# Patient Record
Sex: FEMALE | Race: WHITE | URBAN - METROPOLITAN AREA
[De-identification: names, ages, dates, MRNs, and addresses within clinical notes are randomized per-mention and may not be internally consistent; named-entity substitution may affect disease eponyms.]

---

## 2017-07-12 ENCOUNTER — OFFICE VISIT (OUTPATIENT)
Dept: FAMILY MEDICINE CLINIC | Age: 77
End: 2017-07-12

## 2017-07-12 VITALS
WEIGHT: 228 LBS | OXYGEN SATURATION: 97 % | HEIGHT: 63 IN | SYSTOLIC BLOOD PRESSURE: 158 MMHG | DIASTOLIC BLOOD PRESSURE: 86 MMHG | BODY MASS INDEX: 40.4 KG/M2 | HEART RATE: 88 BPM | RESPIRATION RATE: 18 BRPM | TEMPERATURE: 97.6 F

## 2017-07-12 DIAGNOSIS — M54.50 CHRONIC BILATERAL LOW BACK PAIN WITHOUT SCIATICA: ICD-10-CM

## 2017-07-12 DIAGNOSIS — I10 ESSENTIAL HYPERTENSION: ICD-10-CM

## 2017-07-12 DIAGNOSIS — F41.8 DEPRESSION WITH ANXIETY: ICD-10-CM

## 2017-07-12 DIAGNOSIS — H40.9 GLAUCOMA, UNSPECIFIED GLAUCOMA, UNSPECIFIED LATERALITY: ICD-10-CM

## 2017-07-12 DIAGNOSIS — L30.9 DERMATITIS: ICD-10-CM

## 2017-07-12 DIAGNOSIS — E11.9 TYPE 2 DIABETES MELLITUS WITHOUT COMPLICATION, WITHOUT LONG-TERM CURRENT USE OF INSULIN (HCC): ICD-10-CM

## 2017-07-12 DIAGNOSIS — G89.29 CHRONIC BILATERAL LOW BACK PAIN WITHOUT SCIATICA: ICD-10-CM

## 2017-07-12 DIAGNOSIS — E53.8 B12 DEFICIENCY: ICD-10-CM

## 2017-07-12 DIAGNOSIS — I48.20 CHRONIC ATRIAL FIBRILLATION (HCC): Primary | ICD-10-CM

## 2017-07-12 LAB
HBA1C MFR BLD HPLC: 7 %
INR BLD: 2.8
PT POC: NORMAL SECONDS
VALID INTERNAL CONTROL?: YES

## 2017-07-12 RX ORDER — ALBUTEROL SULFATE 90 UG/1
AEROSOL, METERED RESPIRATORY (INHALATION)
COMMUNITY
End: 2017-08-02 | Stop reason: ALTCHOICE

## 2017-07-12 RX ORDER — LORAZEPAM 0.5 MG/1
0.5 TABLET ORAL
COMMUNITY
End: 2017-08-02 | Stop reason: ALTCHOICE

## 2017-07-12 RX ORDER — OMEPRAZOLE 40 MG/1
40 CAPSULE, DELAYED RELEASE ORAL DAILY
COMMUNITY
End: 2018-07-02 | Stop reason: SDUPTHER

## 2017-07-12 RX ORDER — CLONIDINE HYDROCHLORIDE 0.1 MG/1
TABLET ORAL 2 TIMES DAILY
COMMUNITY
End: 2017-07-27 | Stop reason: ALTCHOICE

## 2017-07-12 RX ORDER — DULOXETIN HYDROCHLORIDE 60 MG/1
60 CAPSULE, DELAYED RELEASE ORAL DAILY
COMMUNITY
End: 2017-07-12 | Stop reason: ALTCHOICE

## 2017-07-12 RX ORDER — LOSARTAN POTASSIUM 50 MG/1
TABLET ORAL DAILY
COMMUNITY
End: 2018-05-24 | Stop reason: SDUPTHER

## 2017-07-12 RX ORDER — SUCRALFATE 1 G/1
1 TABLET ORAL 4 TIMES DAILY
COMMUNITY
End: 2018-03-06 | Stop reason: SDUPTHER

## 2017-07-12 RX ORDER — TRAZODONE HYDROCHLORIDE 50 MG/1
TABLET ORAL
COMMUNITY
End: 2018-07-12 | Stop reason: SDUPTHER

## 2017-07-12 RX ORDER — METFORMIN HYDROCHLORIDE 1000 MG/1
500 TABLET ORAL DAILY
COMMUNITY
End: 2018-05-07 | Stop reason: ALTCHOICE

## 2017-07-12 RX ORDER — AMLODIPINE BESYLATE 5 MG/1
5 TABLET ORAL DAILY
COMMUNITY
End: 2017-07-27 | Stop reason: ALTCHOICE

## 2017-07-12 RX ORDER — WARFARIN 7.5 MG/1
7.5 TABLET ORAL DAILY
COMMUNITY
End: 2017-07-12 | Stop reason: SDUPTHER

## 2017-07-12 RX ORDER — TRIAMCINOLONE ACETONIDE 1 MG/G
OINTMENT TOPICAL 2 TIMES DAILY
Qty: 30 G | Refills: 0 | Status: SHIPPED | OUTPATIENT
Start: 2017-07-12 | End: 2019-01-21 | Stop reason: SDUPTHER

## 2017-07-12 RX ORDER — RANITIDINE 150 MG/1
150 CAPSULE ORAL 2 TIMES DAILY
COMMUNITY
End: 2018-07-12 | Stop reason: ALTCHOICE

## 2017-07-12 RX ORDER — WARFARIN 7.5 MG/1
7.5 TABLET ORAL DAILY
Qty: 30 TAB | Refills: 0 | Status: SHIPPED | OUTPATIENT
Start: 2017-07-12 | End: 2017-08-06 | Stop reason: SDUPTHER

## 2017-07-12 RX ORDER — LANOLIN ALCOHOL/MO/W.PET/CERES
500 CREAM (GRAM) TOPICAL DAILY
COMMUNITY
End: 2019-01-28 | Stop reason: ALTCHOICE

## 2017-07-12 RX ORDER — OXYCODONE AND ACETAMINOPHEN 5; 325 MG/1; MG/1
TABLET ORAL
Refills: 0 | COMMUNITY
Start: 2017-07-12 | End: 2017-12-04 | Stop reason: SDUPTHER

## 2017-07-12 RX ORDER — VENLAFAXINE HYDROCHLORIDE 37.5 MG/1
37.5 CAPSULE, EXTENDED RELEASE ORAL DAILY
Qty: 30 CAP | Refills: 0 | Status: SHIPPED | OUTPATIENT
Start: 2017-07-12 | End: 2017-08-02 | Stop reason: SDUPTHER

## 2017-07-12 NOTE — MR AVS SNAPSHOT
Visit Information Date & Time Provider Department Dept. Phone Encounter #  
 7/12/2017  1:30 PM Elizabeth Velez, 3 [facility] [phone] [id number] Upcoming Health Maintenance Date Due DTaP/Tdap/Td series (1 - Tdap) 4/21/1959 ZOSTER VACCINE AGE 60> 4/21/1998 GLAUCOMA SCREENING Q2Y 4/21/2003 OSTEOPOROSIS SCREENING (DEXA) 4/21/2003 Pneumococcal 65+ Low/Medium Risk (1 of 2 - PCV13) 4/21/2003 MEDICARE YEARLY EXAM 4/21/2003 INFLUENZA AGE 9 TO ADULT 8/1/2017 Allergies as of 7/12/2017  Review Complete On: 7/12/2017 By: Renetta Delaney LPN Severity Noted Reaction Type Reactions Sulfa (Sulfonamide Antibiotics)  07/12/2017    Photosensitivity Current Immunizations  Never Reviewed No immunizations on file. Not reviewed this visit You Were Diagnosed With   
  
 Codes Comments Chronic atrial fibrillation (HCC)    -  Primary ICD-10-CM: E04.0 ICD-9-CM: 427.31   
 B12 deficiency     ICD-10-CM: E53.8 ICD-9-CM: 266.2 Type 2 diabetes mellitus without complication, without long-term current use of insulin (HCC)     ICD-10-CM: E11.9 ICD-9-CM: 250.00 Essential hypertension     ICD-10-CM: I10 
ICD-9-CM: 401.9 Glaucoma, unspecified glaucoma, unspecified laterality     ICD-10-CM: H40.9 ICD-9-CM: 365.9 Chronic bilateral low back pain without sciatica     ICD-10-CM: M54.5, G89.29 ICD-9-CM: 724.2, 338.29 Depression with anxiety     ICD-10-CM: F41.8 ICD-9-CM: 300.4 Dermatitis     ICD-10-CM: L30.9 ICD-9-CM: 692.9 Vitals BP Pulse Temp Resp Height(growth percentile) Weight(growth percentile) 158/86 88 97.6 °F (36.4 °C) 18 5' 2.5\" (1.588 m) 228 lb (103.4 kg) SpO2 BMI OB Status Smoking Status 97% 41.04 kg/m2 Hysterectomy Never Smoker Vitals History BMI and BSA Data Body Mass Index Body Surface Area 41.04 kg/m 2 2.14 m 2 Preferred Pharmacy Pharmacy Name Phone [facility] [address] [place] [address] Jessie Ya [phone] Your Updated Medication List  
  
   
This list is accurate as of: 7/12/17  2:11 PM.  Always use your most recent med list. amLODIPine 5 mg tablet Commonly known as:  Antonio Stormy Take 5 mg by mouth daily. ATIVAN 0.5 mg tablet Generic drug:  LORazepam  
Take  by mouth. CARAFATE 1 gram tablet Generic drug:  sucralfate Take 1 g by mouth four (4) times daily. cloNIDine HCl 0.1 mg tablet Commonly known as:  CATAPRES Take  by mouth two (2) times a day. cyanocobalamin 500 mcg tablet Commonly known as:  VITAMIN B12 Take 500 mcg by mouth daily. losartan 50 mg tablet Commonly known as:  COZAAR Take  by mouth daily. metFORMIN 1,000 mg tablet Commonly known as:  GLUCOPHAGE Take 1,000 mg by mouth two (2) times daily (with meals). omeprazole 40 mg capsule Commonly known as:  PRILOSEC Take 40 mg by mouth daily. oxyCODONE-acetaminophen 5-325 mg per tablet Commonly known as:  PERCOCET Take  by mouth every four (4) hours as needed for Pain. PROAIR HFA 90 mcg/actuation inhaler Generic drug:  albuterol Take  by inhalation. raNITIdine hcl 150 mg capsule Take 150 mg by mouth two (2) times a day. traZODone 50 mg tablet Commonly known as:  Samule Grills Take  by mouth nightly. triamcinolone acetonide 0.1 % ointment Commonly known as:  KENALOG Apply  to affected area two (2) times a day. use thin layer  
  
 venlafaxine-SR 37.5 mg capsule Commonly known as:  EFFEXOR-XR Take 1 Cap by mouth daily. warfarin 7.5 mg tablet Commonly known as:  COUMADIN Take 1 Tab by mouth daily. Prescriptions Sent to Pharmacy Refills  
 venlafaxine-SR (EFFEXOR-XR) 37.5 mg capsule 0 Sig: Take 1 Cap by mouth daily.   
 Class: Normal  
 Pharmacy: [facility] [address] [facility] Ph #: [phone] Route: Oral  
 warfarin (COUMADIN) 7.5 mg tablet 0 Sig: Take 1 Tab by mouth daily. Class: Normal  
 Pharmacy: [id number] [place] [address] Alida Agarwal Ph #: [phone] Route: Oral  
 triamcinolone acetonide (KENALOG) 0.1 % ointment 0 Sig: Apply  to affected area two (2) times a day. use thin layer Class: Normal  
 Pharmacy: [id number] [place] [address] Alida Agarwal Ph #: [phone] Route: Topical  
  
We Performed the Following AMB POC HEMOGLOBIN A1C [58871 CPT(R)] AMB POC PT/INR [94564 CPT(R)] REFERRAL TO OPHTHALMOLOGY [REF57 Custom] REFERRAL TO PAIN MANAGEMENT [QED098 Custom] Comments:  
 Please evaluate patient for chronic back pain. To-Do List   
 07/12/2017 Lab:  CBC W/O DIFF   
  
 07/12/2017 Lab:  LIPID PANEL   
  
 07/12/2017 Lab:  METABOLIC PANEL, COMPREHENSIVE   
  
 07/12/2017 Lab:  MICROALBUMIN, UR, RAND W/ MICROALBUMIN/CREA RATIO   
  
 07/12/2017 Lab:  VITAMIN B12 Referral Information Referral ID Referred By Referred To  
  
 [id number] [facility], Mouna Tyler MD   
   [address] [facility] , [address] [place] [address] Phone: [phone] Fax: [phone] Visits Status Start Date End Date 1 New Request 7/12/17 7/12/18 If your referral has a status of pending review or denied, additional information will be sent to support the outcome of this decision. Referral ID Referred By Referred To  
 [id number] [facility], MD Rosa Maria  
   [address] [facility], [address] Phone: [phone] Fax: [phone] Visits Status Start Date End Date 1 New Request 7/12/17 7/12/18 If your referral has a status of pending review or denied, additional information will be sent to support the outcome of this decision. Introducing [facility] SERVICES! Hilary Robles introduces [employer] patient portal. Now you can access parts of your medical record, email your doctor's office, and request medication refills online. 1. In your internet browser, go to [email or web address] 2. Click on the First Time User? Click Here link in the Sign In box. You will see the New Member Sign Up page. 3. Enter your [employer] Access Code exactly as it appears below. You will not need to use this code after youve completed the sign-up process. If you do not sign up before the expiration date, you must request a new code. · [employer] Access Code: [email or web address] Expires: 10/10/2017  2:11 PM 
 
4. Enter the last four digits of your Social Security Number (xxxx) and Date of Birth (mm/dd/yyyy) as indicated and click Submit. You will be taken to the next sign-up page. 5. Create a [employer] ID. This will be your [employer] login ID and cannot be changed, so think of one that is secure and easy to remember. 6. Create a [employer] password. You can change your password at any time. 7. Enter your Password Reset Question and Answer. This can be used at a later time if you forget your password. 8. Enter your e-mail address. You will receive e-mail notification when new information is available in [address] 9. Click Sign Up. You can now view and download portions of your medical record. 10. Click the Download Summary menu link to download a portable copy of your medical information. If you have questions, please visit the Frequently Asked Questions section of the [employer] website. Remember, [employer] is NOT to be used for urgent needs. For medical emergencies, dial 911. Now available from your iPhone and Android! Please provide this summary of care documentation to your next provider. Your primary care clinician is listed as Jorge 13. If you have any questions after today's visit, please call [phone].

## 2017-07-12 NOTE — PROGRESS NOTES
New Carter is a 76 y.o. female here today to establish care. 1. Have you been to the ER, urgent care clinic since your last visit? Hospitalized since your last visit? No    2. Have you seen or consulted any other health care providers outside of the [address] [place] [address] since your last visit? Include any pap smears or colon screening.  No

## 2017-07-12 NOTE — PROGRESS NOTES
Assessment/Plan:    Jose Antoine was seen today for [facility] care. Diagnoses and all orders for this visit:    Chronic atrial fibrillation ([address]- cont current regimen. Fu in 2weeks for INR  -     AMB POC PT/INR  -     warfarin (COUMADIN) 7.5 mg tablet; Take 1 Tab by mouth daily. B12 deficiency  -     VITAMIN B12; Future    Type 2 diabetes mellitus without complication, without long-term current use of insulin (HCC)-cont metformin. A1c 7.0.  -     METABOLIC PANEL, COMPREHENSIVE; Future  -     LIPID PANEL; Future  -     CBC W/O DIFF; Future  -     MICROALBUMIN, UR, RAND W/ MICROALBUMIN/CREA RATIO; Future  -     AMB POC HEMOGLOBIN A1C  -     REFERRAL TO OPHTHALMOLOGY    Essential hypertension-slightly high today. F/u 1 mo for bp ck  -     METABOLIC PANEL, COMPREHENSIVE; Future  -     LIPID PANEL; Future  -     CBC W/O DIFF; Future    Glaucoma, unspecified glaucoma, unspecified laterality  -     REFERRAL TO OPHTHALMOLOGY    Chronic bilateral low back pain without sciatica- on percocet  -     REFERRAL TO PAIN MANAGEMENT    Depression with anxiety-change cymbalta to effexor.  -     venlafaxine-SR (EFFEXOR-XR) 37.5 mg capsule; Take 1 Cap by mouth daily. Dermatitis  -refilled  -     triamcinolone acetonide (KENALOG) 0.1 % ointment; Apply  to affected area two (2) times a day. use thin layer        The plan was discussed with the patient. The patient verbalized understanding and is in agreement with the plan. All medication potential side effects were discussed with the patient.   Health Maintenance:   Health Maintenance   Topic Date Due    DTaP/Tdap/Td series (1 - Tdap) 04/21/1959    ZOSTER VACCINE AGE 60>  04/21/1998    GLAUCOMA SCREENING Q2Y  04/21/2003    OSTEOPOROSIS SCREENING (DEXA)  04/21/2003    Pneumococcal 65+ Low/Medium Risk (1 of 2 - PCV13) 04/21/2003    MEDICARE YEARLY EXAM  04/21/2003    INFLUENZA AGE 9 TO ADULT  08/01/2017       Winston Lind is a 76 y.o.  female and presents with \[facility]\"" Care Subjective:  Pt is here to establish care. HTN- slightly high today. afib - INR today is 2.8. On coumadin 7.5mg.     DM2 - on metformin. Last eye exam was 3 mos ago in [place]. a1c 7.0. Asthma - uses albuterol on sparingly. Has h/o breast cancer on R- s/p mastectomy, chemo. No XRT. Depression  -on cymbalta. Sx are not controlled. States she will wake up feeling like everything is gloomy. Uses trazodone for sleep. Doesn't feel her anxiety is controlled. Has been on zoloft but it was ineffective. Back pain - on cymbalta. She is unsure why she has pain in her back. Reportedly had MRI that showed arthritis. Had previously been on narcotics- she weaned herself off that. She takes oxycodone 5/325 bid. Chronic gastritis - on PPI and H2 blocker. Also on carafate. Denies PUD. States they weren't able to find a reason why she had such abd pain. ROS:  Constitutional: No recent weight change. No weakness/fatigue. No f/c. Skin: No rashes, change in nails/hair, itching   HENT: No HA, dizziness. No hearing loss/tinnitus. No nasal congestion/discharge. Eyes: No change in vision, double/blurred vision or eye pain/redness. Cardiovascular: No CP/palpitations. No CHO/orthopnea/PND. Respiratory: No cough/sputum, dyspnea, wheezing. Gastointestinal: No dysphagia,+ reflux. No n/v. No constipation/diarrhea. No melena/rectal bleeding. Genitourinary: No dysuria, urinary hesitancy, nocturia, hematuria. No incontinence. Musculoskeletal: + joint pain/stiffness. No muscle pain/tenderness. Endo: No heat/cold intolerance, no polyuria/polydypsia. Heme: No h/o anemia. No easy bleeding/bruising. Allergy/Immunology: No seasonal rhinitis. Denies frequent colds, sinus/ear infections. Neurological: No seizures/numbness/weakness. No paresthesias. Psychiatric:  + depression, +anxiety.    PMH:  Past Medical History:   Diagnosis Date    A-fib ([facility] [address]     Asthma     Chronic back pain     Depression     Diabetes ([facility]     Gastritis     History of right breast cancer     Hypertension        PSH:  Past Surgical History:   Procedure Laterality Date    HX APPENDECTOMY      HX MASTECTOMY      right        SH:  Social History   Substance Use Topics    Smoking status: Never Smoker    Smokeless tobacco: Never Used    Alcohol use No       FH:  Family History   Problem Relation Age of Onset    Asthma Mother     Hypertension Father     Heart Attack Father        Medications/Allergies:    Current Outpatient Prescriptions:     metFORMIN (GLUCOPHAGE) 1,000 mg tablet, Take 1,000 mg by mouth two (2) times daily (with meals). , Disp: , Rfl:     warfarin (COUMADIN) 7.5 mg tablet, Take 7.5 mg by mouth daily. , Disp: , Rfl:     losartan (COZAAR) 50 mg tablet, Take  by mouth daily. , Disp: , Rfl:     cloNIDine HCl (CATAPRES) 0.1 mg tablet, Take  by mouth two (2) times a day., Disp: , Rfl:     albuterol (PROAIR HFA) 90 mcg/actuation inhaler, Take  by inhalation. , Disp: , Rfl:     omeprazole (PRILOSEC) 40 mg capsule, Take 40 mg by mouth daily. , Disp: , Rfl:     raNITIdine hcl 150 mg capsule, Take 150 mg by mouth two (2) times a day., Disp: , Rfl:     sucralfate (CARAFATE) 1 gram tablet, Take 1 g by mouth four (4) times daily. , Disp: , Rfl:     DULoxetine (CYMBALTA) 60 mg capsule, Take 60 mg by mouth daily. , Disp: , Rfl:     traZODone (DESYREL) 50 mg tablet, Take  by mouth nightly., Disp: , Rfl:     LORazepam (ATIVAN) 0.5 mg tablet, Take  by mouth., Disp: , Rfl:     cyanocobalamin (VITAMIN B12) 500 mcg tablet, Take 500 mcg by mouth daily. , Disp: , Rfl:     amLODIPine (NORVASC) 5 mg tablet, Take 5 mg by mouth daily. , Disp: , Rfl:   Allergies   Allergen Reactions    Sulfa (Sulfonamide Antibiotics) Photosensitivity       Objective:  Visit Vitals    /86    Pulse 88    Temp 97.6 °F (36.4 °C)    Resp 18    Ht 5' 2.5\" (1.588 m)    Wt 228 lb (103.4 kg)    SpO2 97%    BMI 41.04 kg/m2 Constitutional: Well developed, nourished, no distress, alert, obese habitus   HENT: Exterior ears and tympanic membranes normal bilaterally. Supple neck. No thyromegaly or lymphadenopathy. Oropharynx clear and moist mucous membranes. Eyes: Conjunctiva normal. PERRL. Cardiovascular: S1, S2.  RRR. 2/6 CLIVE SABINE w/o radiation. No thrills palpated. No carotid bruits. Intact distal pulses. No edema. Pulmonary/Chest Wall: No abnormalities on inspection. Clear to auscultation bilaterally. No wheezing/rhonchi. Normal effort. GI: Soft, nontender, nondistended. Normal active bowel sounds. No  masses on palpation. No hepatosplenomegaly. Musculoskeletal: Gait normal.  Joints without deformity/tenderness. Neurological: Appropriate. No focal motor or sensory deficits. Speech normal.   Skin: No lesions/rashes on inspection. Psych: Appropriate affect, judgement and insight. Short-term memory intact.

## 2017-07-13 PROBLEM — M85.89 OSTEOPENIA OF MULTIPLE SITES: Status: ACTIVE | Noted: 2017-07-13

## 2017-07-13 PROBLEM — G47.33 OSA (OBSTRUCTIVE SLEEP APNEA): Status: ACTIVE | Noted: 2017-07-13

## 2017-07-21 ENCOUNTER — HOSPITAL ENCOUNTER (OUTPATIENT)
Dept: LAB | Age: 77
Discharge: HOME | End: 2017-07-21

## 2017-07-21 PROCEDURE — 99001 SPECIMEN HANDLING PT-LAB: CPT | Performed by: INTERNAL MEDICINE

## 2017-07-22 LAB
ALBUMIN SERPL-MCNC: 4.1 G/DL (ref 3.5–4.8)
ALBUMIN/CREAT UR: 8.1 MG/G CREAT (ref 0–30)
ALBUMIN/GLOB SERPL: 1.4 {RATIO} (ref 1.2–2.2)
ALP SERPL-CCNC: 114 IU/L (ref 39–117)
ALT SERPL-CCNC: 17 IU/L (ref 0–32)
AST SERPL-CCNC: 32 IU/L (ref 0–40)
BILIRUB SERPL-MCNC: 0.3 MG/DL (ref 0–1.2)
BUN SERPL-MCNC: 11 MG/DL (ref 8–27)
BUN/CREAT SERPL: 14 (ref 12–28)
CALCIUM SERPL-MCNC: 10.1 MG/DL (ref 8.7–10.3)
CHLORIDE SERPL-SCNC: 101 MMOL/L (ref 96–106)
CHOLEST SERPL-MCNC: 187 MG/DL (ref 100–199)
CO2 SERPL-SCNC: 24 MMOL/L (ref 18–29)
CREAT SERPL-MCNC: 0.76 MG/DL (ref 0.57–1)
CREAT UR-MCNC: 220.3 MG/DL
ERYTHROCYTE [DISTWIDTH] IN BLOOD BY AUTOMATED COUNT: 16.4 % (ref 12.3–15.4)
GLOBULIN SER CALC-MCNC: 3 G/DL (ref 1.5–4.5)
GLUCOSE SERPL-MCNC: 140 MG/DL (ref 65–99)
HCT VFR BLD AUTO: 36.6 % (ref 34–46.6)
HDLC SERPL-MCNC: 46 MG/DL
HGB BLD-MCNC: 11.3 G/DL (ref 11.1–15.9)
INTERPRETATION, 910389: NORMAL
LDLC SERPL CALC-MCNC: 103 MG/DL (ref 0–99)
MCH RBC QN AUTO: 25.7 PG (ref 26.6–33)
MCHC RBC AUTO-ENTMCNC: 30.9 G/DL (ref 31.5–35.7)
MCV RBC AUTO: 83 FL (ref 79–97)
MICROALBUMIN UR-MCNC: 17.9 UG/ML
PLATELET # BLD AUTO: 284 X10E3/UL (ref 150–379)
POTASSIUM SERPL-SCNC: 4.4 MMOL/L (ref 3.5–5.2)
PROT SERPL-MCNC: 7.1 G/DL (ref 6–8.5)
RBC # BLD AUTO: 4.39 X10E6/UL (ref 3.77–5.28)
SODIUM SERPL-SCNC: 142 MMOL/L (ref 134–144)
TRIGL SERPL-MCNC: 189 MG/DL (ref 0–149)
VIT B12 SERPL-MCNC: 1021 PG/ML (ref 211–946)
VLDLC SERPL CALC-MCNC: 38 MG/DL (ref 5–40)
WBC # BLD AUTO: 6.3 X10E3/UL (ref 3.4–10.8)

## 2017-07-24 ENCOUNTER — TELEPHONE (OUTPATIENT)
Dept: FAMILY MEDICINE CLINIC | Age: 77
End: 2017-07-24

## 2017-07-24 NOTE — TELEPHONE ENCOUNTER
Pt called with BP readings 133/98 rechecked 133/122, \"not feeling well\", pulse 132. Advised pt to go to [facility] , gave directions to spouse, pt new to area.

## 2017-07-27 ENCOUNTER — PATIENT OUTREACH (OUTPATIENT)
Dept: FAMILY MEDICINE CLINIC | Age: 77
End: 2017-07-27

## 2017-07-27 PROBLEM — E11.9 TYPE 2 DIABETES MELLITUS WITHOUT COMPLICATION, WITHOUT LONG-TERM CURRENT USE OF INSULIN (HCC): Status: ACTIVE | Noted: 2017-07-24

## 2017-07-27 PROBLEM — I48.91 ATRIAL FIBRILLATION WITH RAPID VENTRICULAR RESPONSE (HCC): Status: ACTIVE | Noted: 2017-07-24

## 2017-07-27 PROBLEM — I11.9 HYPERTENSIVE HEART DISEASE WITHOUT HEART FAILURE: Status: ACTIVE | Noted: 2017-07-24

## 2017-07-27 PROBLEM — I48.91 A-FIB (HCC): Status: ACTIVE | Noted: 2017-07-24

## 2017-07-27 PROBLEM — I45.10 RIGHT BUNDLE BRANCH BLOCK (RBBB): Status: ACTIVE | Noted: 2017-07-24

## 2017-07-27 RX ORDER — METOPROLOL TARTRATE 50 MG/1
50 TABLET ORAL 2 TIMES DAILY
COMMUNITY
End: 2017-08-02 | Stop reason: ALTCHOICE

## 2017-07-27 RX ORDER — LATANOPROST 50 UG/ML
1 SOLUTION/ DROPS OPHTHALMIC
COMMUNITY
End: 2019-01-28 | Stop reason: ALTCHOICE

## 2017-07-27 RX ORDER — ACETAMINOPHEN 500 MG
500 TABLET ORAL
COMMUNITY
Start: 2017-07-26 | End: 2019-01-28 | Stop reason: ALTCHOICE

## 2017-07-27 RX ORDER — DILTIAZEM HYDROCHLORIDE 60 MG/1
60 CAPSULE, EXTENDED RELEASE ORAL EVERY 12 HOURS
COMMUNITY
Start: 2017-07-26 | End: 2017-08-07 | Stop reason: SDUPTHER

## 2017-07-27 NOTE — PROGRESS NOTES
[facility] Admission from 7/24 - 26/2017 for A-Fib with Rapid Ventricular Rate. RRAT score: 19 Medium    Medical History:     Past Medical History:   Diagnosis Date    A-fib ([address]     Asthma     Chronic back pain     Depression     Diabetes ([address]     Gastritis     History of right breast cancer     Hypertension           This represents Transitions of Care b/c NN spoke with patient and/or caregiver within 1 business days of discharge. Pt's TCM follow up appt is scheduled with Dr. Viral Mckinnon on Wednesday 8/2/2017 @ 2 pm which is within 5 business days of discharge.  Called patient on 7/27/2017,first number no one answered voice mail not set up. Second number spouse answered and gave patient the phone. Verified with 2 identifiers.      Patient presenting symptoms Rapid Heart Rate          Course of current Hospitalization (referenced by Levy Ferguson MD - 07/26/2017   note): [facility] Problems   Diagnosis    Atrial fibrillation with rapid ventricular response ([address]   7/24/17 ER \"76 yo female presents with palpitations for 2 weeks. She moved here from [address] [place] [address] and recently established care with a new pcp, not a cardiologist. For the alst 2 weeks she has felt palpitations\" Hx AF on warfarin  /96,  AF with RBBB, K 3.7, TSH 1.72, Troponin < 0.01 x2  7/25/17 ECHO EF 75%, LVH 12/11, Mild MR with MAC     Right bundle branch block (RBBB)    A-fib ([address]    Hypertensive heart disease without heart failure   On clonidine, metoprolol, losartan     Type 2 diabetes mellitus without complication, without long-term current use of insulin ([address]   On metformin    This is a 76year old female with A fib and RVR. She ruled out for MI and her echocardiogram showed normal systolic function but diastolic dysfunction. She was seen by cardiology and her medications were adjusted. She converted back to NSR. She is dismissed in good condition.  Diagnosed with A-Fib With Rapid Ventricular Rate. Admitted to Hospitalist Service with consults from [place]. Seen by Dr Maddie Ovalles, Cardiologist . Card given to patient instructing her to have PCP get referral for a follow up to be seen in his office.      Significant Lab/Diagnostic Findings: PT/INR 20.1 /1.96 Discharge on 7.5 mg coumadin              Medication Reconciliation completed: YES                                  Roland See   

## 2017-08-02 ENCOUNTER — OFFICE VISIT (OUTPATIENT)
Dept: FAMILY MEDICINE CLINIC | Age: 77
End: 2017-08-02

## 2017-08-02 VITALS
OXYGEN SATURATION: 96 % | BODY MASS INDEX: 41.41 KG/M2 | RESPIRATION RATE: 22 BRPM | HEART RATE: 86 BPM | WEIGHT: 225 LBS | SYSTOLIC BLOOD PRESSURE: 138 MMHG | HEIGHT: 62 IN | TEMPERATURE: 97.7 F | DIASTOLIC BLOOD PRESSURE: 82 MMHG

## 2017-08-02 DIAGNOSIS — R06.2 WHEEZING: ICD-10-CM

## 2017-08-02 DIAGNOSIS — F41.8 DEPRESSION WITH ANXIETY: Primary | ICD-10-CM

## 2017-08-02 DIAGNOSIS — Z79.01 ANTICOAGULATION MONITORING, INR RANGE 2-3: ICD-10-CM

## 2017-08-02 DIAGNOSIS — I48.0 PAROXYSMAL ATRIAL FIBRILLATION (HCC): ICD-10-CM

## 2017-08-02 DIAGNOSIS — L98.9 SKIN LESION: ICD-10-CM

## 2017-08-02 PROBLEM — I48.91 ATRIAL FIBRILLATION WITH RAPID VENTRICULAR RESPONSE (HCC): Status: RESOLVED | Noted: 2017-07-24 | Resolved: 2017-08-02

## 2017-08-02 LAB
INR BLD: 2.9
PT POC: 34.5 SECONDS
VALID INTERNAL CONTROL?: YES

## 2017-08-02 RX ORDER — METOPROLOL TARTRATE 50 MG/1
TABLET ORAL 2 TIMES DAILY
COMMUNITY
End: 2018-07-26 | Stop reason: SDUPTHER

## 2017-08-02 RX ORDER — VENLAFAXINE HYDROCHLORIDE 75 MG/1
75 CAPSULE, EXTENDED RELEASE ORAL DAILY
Qty: 30 CAP | Refills: 1 | Status: SHIPPED | OUTPATIENT
Start: 2017-08-02 | End: 2017-09-06 | Stop reason: SDUPTHER

## 2017-08-02 NOTE — PROGRESS NOTES
Assessment/Plan:    1. Depression with anxiety  -incr dose to 75mg. F/u in 1mo. - venlafaxine-SR (EFFEXOR-XR) 75 mg capsule; Take 1 Cap by mouth daily. Dispense: 30 Cap; Refill: 1    2. Paroxysmal atrial fibrillation (HCC)  -in sinus currently. Cont BB and dilt. F/u with Dr. Jenny Hendrickson from [place] [address]    3. Wheezing  -ck PFTs  - PULMONARY FUNCTION TEST; Future    4. Skin lesion- seems to respond to steroids, but spreading.  - REFERRAL TO DERMATOLOGY    5. Anticoagulation monitoring, INR range 2-3  -INR at goal. Cont 7.5mg. Repeat INR in 1 mo    6. Fatigue  -multifactorial - related to uncontrolled depression, untreated dairan, medication side effects    The plan was discussed with the patient. The patient verbalized understanding and is in agreement with the plan. All medication potential side effects were discussed with the patient. Health Maintenance: referred to [facility], pt to make appt. Health Maintenance   Topic Date Due    EYE EXAM RETINAL OR DILATED Q1  04/21/1948    DTaP/Tdap/Td series (1 - Tdap) 04/21/1959    ZOSTER VACCINE AGE 60>  02/21/1998    Pneumococcal 65+ Low/Medium Risk (1 of 2 - PCV13) 04/21/2003    MEDICARE YEARLY EXAM  04/21/2003    INFLUENZA AGE 9 TO ADULT  08/01/2017    HEMOGLOBIN A1C Q6M  01/12/2018    MICROALBUMIN Q1  07/21/2018    LIPID PANEL Q1  07/21/2018    FOOT EXAM Q1  08/02/2018    GLAUCOMA SCREENING Q2Y  05/18/2019    OSTEOPOROSIS SCREENING (DEXA)  Completed       Pura Grullon is a 76 y.o. female and presents with Hospital Follow Up and Irregular Heart Beat     Subjective:  Ms. Pura Grullon is a 76y.o. year old female, she is seen today for Transition of Care services following a hospital discharge for afib with RVR on 7/26. Our office Nurse Navigator performed an outreach to Ms. Maru Bradley on 7/27 (within 2 business days of discharge) to complete medication reconciliation and a telephonic assessment of her condition.   She was started on diltiazem and converted to NSR. She states she still feels fatigued. She is having sweats. No further palpitations. HR up to 90s at home. Echo showed stage II diastolic dysfunction. INR today is 2.9 on 7.5mg coumadin. Depression and anxiety - was given ativan in the hospital.  Was changed to effexor at last visit. Pt's daughter notes wheezing. Pt has h/o being prescribed symbicort but states she's never had PFTs. Fatigue - daughter is concerned with fatigue. ROS:  Constitutional: No recent weight change. No weakness/+fatigue. No f/c. Cardiovascular: No CP/+palpitations. No CHO/orthopnea/PND. Respiratory: No cough/sputum, dyspnea, +wheezing. Gastointestinal: No dysphagia, reflux. No n/v. No constipation/diarrhea. No melena/rectal bleeding. Genitourinary: No dysuria, urinary hesitancy, nocturia, hematuria. No incontinence. Musculoskeletal: No joint pain/stiffness. No muscle pain/tenderness. Endo: No heat/cold intolerance, no polyuria/polydypsia. Heme: No h/o anemia. No easy bleeding/bruising. Allergy/Immunology: No seasonal rhinitis. Denies frequent colds, sinus/ear infections. Neurological: No seizures/numbness/weakness. No paresthesias. Psychiatric:  + depression, +anxiety. The problem list was updated as a part of today's visit. Patient Active Problem List   Diagnosis Code    Osteopenia of multiple sites M85.89    ANA CRISTINA (obstructive sleep apnea) G47.33    A-fib ([facility]) I48.91    Atrial fibrillation with rapid ventricular response ([facility]) I48.91    Hypertensive heart disease without heart failure I11.9    Right bundle branch block (RBBB) I45.10    Type 2 diabetes mellitus without complication, without long-term current use of insulin ([facility]) E11.9       The PSH, FH were reviewed.     SH:  Social History   Substance Use Topics    Smoking status: Never Smoker    Smokeless tobacco: Never Used    Alcohol use No       Medications/Allergies:  Current Outpatient Prescriptions on File Prior to Visit   Medication Sig Dispense Refill    acetaminophen (TYLENOL) 500 mg tablet Take 500 mg by mouth every four (4) hours as needed.  brinzolamide-brimonidine 1-0.2 % drps Administer 1 Drop to both eyes two (2) times a day.  dilTIAZem SR (CARDIZEM SR) 60 mg SR capsule Take 60 mg by mouth every twelve (12) hours.  latanoprost (XALATAN) 0.005 % ophthalmic solution Administer 1 Drop to both eyes nightly.  metFORMIN (GLUCOPHAGE) 1,000 mg tablet Take 1,000 mg by mouth daily.  losartan (COZAAR) 50 mg tablet Take  by mouth daily.  omeprazole (PRILOSEC) 40 mg capsule Take 40 mg by mouth daily.  raNITIdine hcl 150 mg capsule Take 150 mg by mouth two (2) times a day.  sucralfate (CARAFATE) 1 gram tablet Take 1 g by mouth four (4) times daily.  traZODone (DESYREL) 50 mg tablet Take  by mouth nightly.  cyanocobalamin (VITAMIN B12) 500 mcg tablet Take 500 mcg by mouth daily.  oxyCODONE-acetaminophen (PERCOCET) 5-325 mg per tablet Take  by mouth every four (4) hours as needed for Pain.  0    venlafaxine-SR (EFFEXOR-XR) 37.5 mg capsule Take 1 Cap by mouth daily. 30 Cap 0    warfarin (COUMADIN) 7.5 mg tablet Take 1 Tab by mouth daily. 30 Tab 0    triamcinolone acetonide (KENALOG) 0.1 % ointment Apply  to affected area two (2) times a day. use thin layer 30 g 0    metoprolol tartrate (LOPRESSOR) 50 mg tablet Take 50 mg by mouth two (2) times a day.  albuterol (PROAIR HFA) 90 mcg/actuation inhaler Take  by inhalation.  LORazepam (ATIVAN) 0.5 mg tablet Take 0.5 mg by mouth every twelve (12) hours as needed. No current facility-administered medications on file prior to visit.          Allergies   Allergen Reactions    Sulfa (Sulfonamide Antibiotics) Photosensitivity       Objective:  Visit Vitals    /82    Pulse 86    Temp 97.7 °F (36.5 °C) (Oral)    Resp 22    Ht 5' 2\" (1.575 m)    Wt 225 lb (102.1 kg)    SpO2 96%  BMI 41.15 kg/m2      Constitutional: Well developed, nourished, no distress, alert, obese habitus   CV: S1, S2.  RRR. No murmurs/rubs. No thrills palpated. No carotid bruits. Intact distal pulses. No edema. Pulm: No abnormalities on inspection. Clear to auscultation bilaterally. No wheezing/rhonchi. Normal effort. Skin: +erythematous papules on legs bilat    Psych: Appropriate affect, judgement and insight. Short-term memory intact. Diabetic foot exam:     Left:    Filament test normal sensation with micro filament       Labwork and Ancillary Studies:    CBC w/Diff  Lab Results   Component Value Date/Time    WBC 6.3 07/21/2017 09:56 AM    HGB 11.3 07/21/2017 09:56 AM    PLATELET 248 96/43/6635 09:56 AM         Basic Metabolic Profile/LFTs  Lab Results   Component Value Date/Time    Sodium 142 07/21/2017 09:56 AM    Potassium 4.4 07/21/2017 09:56 AM    Chloride 101 07/21/2017 09:56 AM    CO2 24 07/21/2017 09:56 AM    Glucose 140 07/21/2017 09:56 AM    BUN 11 07/21/2017 09:56 AM    Creatinine 0.76 07/21/2017 09:56 AM    BUN/Creatinine ratio 14 07/21/2017 09:56 AM    GFR est AA 86 07/21/2017 09:56 AM    GFR est non-AA 75 07/21/2017 09:56 AM    Calcium 10.1 07/21/2017 09:56 AM      Lab Results   Component Value Date/Time    ALT (SGPT) 17 07/21/2017 09:56 AM    AST (SGOT) 32 07/21/2017 09:56 AM    Alk.  phosphatase 114 07/21/2017 09:56 AM    Bilirubin, total 0.3 07/21/2017 09:56 AM       Cholesterol  Lab Results   Component Value Date/Time    Cholesterol, total 187 07/21/2017 09:56 AM    HDL Cholesterol 46 07/21/2017 09:56 AM    LDL, calculated 103 07/21/2017 09:56 AM    Triglyceride 189 07/21/2017 09:56 AM

## 2017-08-02 NOTE — MR AVS SNAPSHOT
Visit Information Date & Time Provider Department Dept. Phone Encounter #  
 8/2/2017  2:00 PM Luis Bonilla [phone] [id number] Upcoming Health Maintenance Date Due  
 EYE EXAM RETINAL OR DILATED Q1 4/21/1950 DTaP/Tdap/Td series (1 - Tdap) 4/21/1959 ZOSTER VACCINE AGE 60> 2/21/1998 Pneumococcal 65+ Low/Medium Risk (1 of 2 - PCV13) 4/21/2003 MEDICARE YEARLY EXAM 4/21/2003 INFLUENZA AGE 9 TO ADULT 8/1/2017 HEMOGLOBIN A1C Q6M 1/12/2018 MICROALBUMIN Q1 7/21/2018 LIPID PANEL Q1 7/21/2018 FOOT EXAM Q1 8/2/2018 GLAUCOMA SCREENING Q2Y 5/18/2019 Allergies as of 8/2/2017  Review Complete On: 8/2/2017 By: Burak Montgomery LPN Severity Noted Reaction Type Reactions Sulfa (Sulfonamide Antibiotics)  07/12/2017    Photosensitivity Current Immunizations  Never Reviewed No immunizations on file. Not reviewed this visit You Were Diagnosed With   
  
 Codes Comments Paroxysmal atrial fibrillation (HCC)    -  Primary ICD-10-CM: I48.0 ICD-9-CM: 427.31 Depression with anxiety     ICD-10-CM: F41.8 ICD-9-CM: 300.4 Wheezing     ICD-10-CM: R06.2 ICD-9-CM: 786.07 Skin lesion     ICD-10-CM: L98.9 ICD-9-CM: 709.9 Vitals BP Pulse Temp Resp Height(growth percentile) Weight(growth percentile) 138/82 86 97.7 °F (36.5 °C) (Oral) 22 5' 2\" (1.575 m) 225 lb (102.1 kg) SpO2 BMI OB Status Smoking Status 96% 41.15 kg/m2 Hysterectomy Never Smoker BMI and BSA Data Body Mass Index Body Surface Area  
 41.15 kg/m 2 2.11 m 2 Preferred Pharmacy Pharmacy Name Phone [facility] [address] [phone] Your Updated Medication List  
  
   
This list is accurate as of: 8/2/17  2:49 PM.  Always use your most recent med list.  
  
  
  
  
 acetaminophen 500 mg tablet Commonly known as:  TYLENOL  
 Take 500 mg by mouth every four (4) hours as needed. brinzolamide-brimonidine 1-0.2 % Drps Administer 1 Drop to both eyes two (2) times a day. CARAFATE 1 gram tablet Generic drug:  sucralfate Take 1 g by mouth four (4) times daily. cyanocobalamin 500 mcg tablet Commonly known as:  VITAMIN B12 Take 500 mcg by mouth daily. dilTIAZem SR 60 mg SR capsule Commonly known as:  CARDIZEM SR Take 60 mg by mouth every twelve (12) hours. latanoprost 0.005 % ophthalmic solution Commonly known as:  Sherry Adjutant Administer 1 Drop to both eyes nightly. LOPRESSOR 50 mg tablet Generic drug:  metoprolol tartrate Take  by mouth two (2) times a day. losartan 50 mg tablet Commonly known as:  COZAAR Take  by mouth daily. metFORMIN 1,000 mg tablet Commonly known as:  GLUCOPHAGE Take 1,000 mg by mouth daily. omeprazole 40 mg capsule Commonly known as:  PRILOSEC Take 40 mg by mouth daily. oxyCODONE-acetaminophen 5-325 mg per tablet Commonly known as:  PERCOCET Take  by mouth every four (4) hours as needed for Pain. raNITIdine hcl 150 mg capsule Take 150 mg by mouth two (2) times a day. traZODone 50 mg tablet Commonly known as:   [place] Take  by mouth nightly. triamcinolone acetonide 0.1 % ointment Commonly known as:  KENALOG Apply  to affected area two (2) times a day. use thin layer  
  
 venlafaxine-SR 75 mg capsule Commonly known as:  EFFEXOR-XR Take 1 Cap by mouth daily. warfarin 7.5 mg tablet Commonly known as:  COUMADIN Take 1 Tab by mouth daily. Prescriptions Sent to Pharmacy Refills  
 venlafaxine-SR (EFFEXOR-XR) 75 mg capsule 1 Sig: Take 1 Cap by mouth daily. Class: Normal  
 Pharmacy: [address] [place] [address],  [place]  Merle Howell Ph #: [phone] Route: Oral  
  
We Performed the Following REFERRAL TO CARDIOLOGY [UYU97 Custom] REFERRAL TO DERMATOLOGY [REF19 Custom] To-Do List   
 Around 08/02/2017 PFT:  PULMONARY FUNCTION TEST Referral Information Referral ID Referred By Referred To  
  
 [id number] [facility], Jimy Young MD   
   [address] [facility] [address] Phone: [phone] Fax: [phone] Visits Status Start Date End Date 1 New Request 8/2/17 8/2/18 If your referral has a status of pending review or denied, additional information will be sent to support the outcome of this decision. Referral ID Referred By Referred To  
 [id number] [facility], Emery Adams, [address] Phone: [phone] Fax: [phone] Visits Status Start Date End Date 1 New Request 8/2/17 8/2/18 If your referral has a status of pending review or denied, additional information will be sent to support the outcome of this decision. Patient Instructions [facility] Dr Yajaira Krishnan [address] [place] [address], [address] [place] [address] 
[phone] Introducing [facility]! Katy Leslie introduces [employer] patient portal. Now you can access parts of your medical record, email your doctor's office, and request medication refills online. 1. In your internet browser, go to [email or web address] 2. Click on the First Time User? Click Here link in the Sign In box. You will see the New Member Sign Up page. 3. Enter your [employer] Access Code exactly as it appears below. You will not need to use this code after youve completed the sign-up process. If you do not sign up before the expiration date, you must request a new code. · [employer] Access Code: [email or web address] Expires: 10/10/2017  2:11 PM 
 
4.  Enter the last four digits of your Social Security Number (xxxx) and Date of Birth (mm/dd/yyyy) as indicated and click Submit. You will be taken to the next sign-up page. 5. Create a [employer] ID. This will be your [employer] login ID and cannot be changed, so think of one that is secure and easy to remember. 6. Create a [employer] password. You can change your password at any time. 7. Enter your Password Reset Question and Answer. This can be used at a later time if you forget your password. 8. Enter your e-mail address. You will receive e-mail notification when new information is available in [address] 9. Click Sign Up. You can now view and download portions of your medical record. 10. Click the Download Summary menu link to download a portable copy of your medical information. If you have questions, please visit the Frequently Asked Questions section of the [employer] website. Remember, [employer] is NOT to be used for urgent needs. For medical emergencies, dial 911. Now available from your iPhone and Android! Please provide this summary of care documentation to your next provider. Your primary care clinician is listed as Jorge 13. If you have any questions after today's visit, please call [phone].

## 2017-08-02 NOTE — PATIENT INSTRUCTIONS
[facility]  Dr Shannan Gilbert  [address] [place] [address]  [phone]

## 2017-08-02 NOTE — PROGRESS NOTES
New Carter is a 76 y.o. female  Patient here for hospital follow up. 1. Have you been to the ER, urgent care clinic since your last visit? Hospitalized since your last visit? Dwayne oliver    2. Have you seen or consulted any other health care providers outside of the [address] [place] [address] since your last visit? Include any pap smears or colon screening.  No

## 2017-08-04 ENCOUNTER — TELEPHONE (OUTPATIENT)
Dept: FAMILY MEDICINE CLINIC | Age: 77
End: 2017-08-04

## 2017-08-04 NOTE — TELEPHONE ENCOUNTER
Pt called in reporting that the current doctor we had set up with for dermatology does not accept her insurance([employer] HMO/medicare replacement). Please advise.

## 2017-08-05 DIAGNOSIS — I48.20 CHRONIC ATRIAL FIBRILLATION (HCC): ICD-10-CM

## 2017-08-06 RX ORDER — WARFARIN 7.5 MG/1
TABLET ORAL
Qty: 30 TAB | Refills: 0 | Status: SHIPPED | COMMUNITY
Start: 2017-08-06 | End: 2017-08-31 | Stop reason: SDUPTHER

## 2017-08-07 ENCOUNTER — PATIENT OUTREACH (OUTPATIENT)
Dept: FAMILY MEDICINE CLINIC | Age: 77
End: 2017-08-07

## 2017-08-07 RX ORDER — DILTIAZEM HYDROCHLORIDE 60 MG/1
60 CAPSULE, EXTENDED RELEASE ORAL EVERY 12 HOURS
Qty: 60 CAP | Refills: 0 | Status: SHIPPED | OUTPATIENT
Start: 2017-08-07 | End: 2017-08-31 | Stop reason: SDUPTHER

## 2017-08-07 NOTE — PROGRESS NOTES
Patient Outreach made to patient. She states she is doing okay. She is requesting refill for Dilitazem and Ativan. I explained to patient that  discontinued Ativan on 8/2 and started Effexor. She should contact office next week and speak with  if she still wants refill for the Ativan. Refill request sent to  for Diltiazem. Patient will call our office if any other assistance is needed.

## 2017-08-14 ENCOUNTER — PATIENT OUTREACH (OUTPATIENT)
Dept: FAMILY MEDICINE CLINIC | Age: 77
End: 2017-08-14

## 2017-08-16 PROBLEM — R06.2 WHEEZING: Status: ACTIVE | Noted: 2017-08-16

## 2017-08-23 ENCOUNTER — TELEPHONE (OUTPATIENT)
Dept: FAMILY MEDICINE CLINIC | Age: 77
End: 2017-08-23

## 2017-08-23 NOTE — TELEPHONE ENCOUNTER
[facility] called because they received a referral for the pt but it says \" No approval necessary\" and the patient has a [facility] plan that typically needs approval.     They would like us to fax [employer] approval over to them if we have it.      Fax # [phone]

## 2017-08-24 ENCOUNTER — PATIENT OUTREACH (OUTPATIENT)
Dept: FAMILY MEDICINE CLINIC | Age: 77
End: 2017-08-24

## 2017-08-24 NOTE — PROGRESS NOTES
Patient Outreach made to patient. She states she is doing okay. She declines any assistance from our office at this time. I advised her to call office if assistance is needed.

## 2017-08-31 DIAGNOSIS — I48.20 CHRONIC ATRIAL FIBRILLATION (HCC): ICD-10-CM

## 2017-08-31 RX ORDER — WARFARIN 7.5 MG/1
TABLET ORAL
Qty: 30 TAB | Refills: 0 | Status: SHIPPED | OUTPATIENT
Start: 2017-08-31 | End: 2017-10-05 | Stop reason: SDUPTHER

## 2017-09-06 ENCOUNTER — OFFICE VISIT (OUTPATIENT)
Dept: FAMILY MEDICINE CLINIC | Age: 77
End: 2017-09-06

## 2017-09-06 VITALS
DIASTOLIC BLOOD PRESSURE: 82 MMHG | WEIGHT: 228 LBS | HEIGHT: 62 IN | TEMPERATURE: 98.2 F | SYSTOLIC BLOOD PRESSURE: 160 MMHG | RESPIRATION RATE: 18 BRPM | BODY MASS INDEX: 41.96 KG/M2 | HEART RATE: 77 BPM | OXYGEN SATURATION: 96 %

## 2017-09-06 DIAGNOSIS — I48.0 PAROXYSMAL ATRIAL FIBRILLATION (HCC): ICD-10-CM

## 2017-09-06 DIAGNOSIS — E66.01 SEVERE OBESITY (BMI >= 40) (HCC): ICD-10-CM

## 2017-09-06 DIAGNOSIS — F41.8 DEPRESSION WITH ANXIETY: Primary | ICD-10-CM

## 2017-09-06 DIAGNOSIS — Z79.01 ANTICOAGULATION MONITORING, INR RANGE 2-3: ICD-10-CM

## 2017-09-06 PROBLEM — I10 ESSENTIAL HYPERTENSION: Status: ACTIVE | Noted: 2017-09-06

## 2017-09-06 PROBLEM — I11.9 HYPERTENSIVE HEART DISEASE WITHOUT HEART FAILURE: Status: RESOLVED | Noted: 2017-07-24 | Resolved: 2017-09-06

## 2017-09-06 LAB
INR BLD: 2.7
PT POC: 32.3 SECONDS
VALID INTERNAL CONTROL?: YES

## 2017-09-06 RX ORDER — VENLAFAXINE HYDROCHLORIDE 75 MG/1
75 CAPSULE, EXTENDED RELEASE ORAL DAILY
Qty: 90 CAP | Refills: 1 | Status: SHIPPED | OUTPATIENT
Start: 2017-09-06 | End: 2018-03-05 | Stop reason: SDUPTHER

## 2017-09-06 NOTE — PROGRESS NOTES
Assessment/Plan:    1. Depression with anxiety  -cont current regimen. F/u in 1/2018  - venlafaxine-SR [facility] 75 mg capsule; Take 1 Cap by mouth daily. Dispense: 90 Cap; Refill: 1    2. Paroxysmal atrial fibrillation ([address]  Anticoagulation monitoring, INR range 2-3-cont same dose. F/u in 1mo. - AMB POC PT/INR    3. Severe obesity (BMI >= 40) (HCC)  I have reviewed/discussed the above normal BMI with the patient. I have recommended the following interventions: encourage exercise and monitor weight . [place] The plan was discussed with the patient. The patient verbalized understanding and is in agreement with the plan. All medication potential side effects were discussed with the patient. Health Maintenance:   Health Maintenance   Topic Date Due    Pneumococcal 65+ Low/Medium Risk (1 of 2 - PCV13) 04/21/2003    MEDICARE YEARLY EXAM  04/21/2003    INFLUENZA AGE 9 TO ADULT  08/01/2017    HEMOGLOBIN A1C Q6M  01/12/2018    EYE EXAM RETINAL OR DILATED Q1  05/18/2018    MICROALBUMIN Q1  07/21/2018    LIPID PANEL Q1  07/21/2018    FOOT EXAM Q1  08/02/2018    GLAUCOMA SCREENING Q2Y  05/18/2019    DTaP/Tdap/Td series (2 - Td) 09/06/2027    OSTEOPOROSIS SCREENING (DEXA)  Completed    ZOSTER VACCINE AGE 60>  Addressed       Arnaldo Bianchi is a 76 y.o. female and presents with Hypertension     Subjective:  Depression anxiety - dose effexor increased at last visit. She states she feels better and feels the increased dose is helping. Afib - on coumadin. INR is 2.7. HTN - bp elevated. Was seen by celia, who is managing bp. ROS:  Constitutional: No recent weight change. No weakness/fatigue. No f/c. Cardiovascular: No CP/palpitations. No CHO/orthopnea/PND. Respiratory: No cough/sputum, dyspnea, wheezing. Gastointestinal: No dysphagia, reflux. No n/v. No constipation/diarrhea. No melena/rectal bleeding. Neurological: No seizures/numbness/weakness. No paresthesias.    Psychiatric:  + depression,+ anxiety. The problem list was updated as a part of today's visit. Patient Active Problem List   Diagnosis Code    Osteopenia of multiple sites M85.89    ANA CRISTINA (obstructive sleep apnea) G47.33    A-fib ([facility]) I48.91    Hypertensive heart disease without heart failure I11.9    Right bundle branch block (RBBB) I45.10    Type 2 diabetes mellitus without complication, without long-term current use of insulin ([facility]) E11.9    Anticoagulation monitoring, INR range 2-3 Z79.01    Wheezing R06.2       The PSH, FH were reviewed. SH:  Social History   Substance Use Topics    Smoking status: Never Smoker    Smokeless tobacco: Never Used    Alcohol use No       Medications/Allergies:  Current Outpatient Prescriptions on File Prior to Visit   Medication Sig Dispense Refill    warfarin (COUMADIN) 7.5 mg tablet TAKE ONE TABLET BY MOUTH ONCE DAILY 30 Tab 0    dilTIAZem SR (CARDIZEM SR) 60 mg SR capsule TAKE ONE CAPSULE BY MOUTH EVERY 12 HOURS 180 Cap 1    metoprolol tartrate (LOPRESSOR) 50 mg tablet Take  by mouth two (2) times a day.  venlafaxine-SR (EFFEXOR-XR) 75 mg capsule Take 1 Cap by mouth daily. 30 Cap 1    acetaminophen (TYLENOL) 500 mg tablet Take 500 mg by mouth every four (4) hours as needed.  brinzolamide-brimonidine 1-0.2 % drps Administer 1 Drop to both eyes two (2) times a day.  latanoprost (XALATAN) 0.005 % ophthalmic solution Administer 1 Drop to both eyes nightly.  metFORMIN (GLUCOPHAGE) 1,000 mg tablet Take 1,000 mg by mouth daily.  losartan (COZAAR) 50 mg tablet Take  by mouth daily.  omeprazole (PRILOSEC) 40 mg capsule Take 40 mg by mouth daily.  raNITIdine hcl 150 mg capsule Take 150 mg by mouth two (2) times a day.  sucralfate (CARAFATE) 1 gram tablet Take 1 g by mouth four (4) times daily.  traZODone (DESYREL) 50 mg tablet Take  by mouth nightly.  cyanocobalamin (VITAMIN B12) 500 mcg tablet Take 500 mcg by mouth daily.       oxyCODONE-acetaminophen (PERCOCET) 5-325 mg per tablet Take  by mouth every four (4) hours as needed for Pain.  0    triamcinolone acetonide (KENALOG) 0.1 % ointment Apply  to affected area two (2) times a day. use thin layer 30 g 0     No current facility-administered medications on file prior to visit. Allergies   Allergen Reactions    Sulfa (Sulfonamide Antibiotics) Photosensitivity       Objective:  Visit Vitals    /82 (BP 1 Location: Left arm, BP Patient Position: Sitting)    Pulse 77    Temp 98.2 °F (36.8 °C) (Oral)    Resp 18    Ht 5' 2\" (1.575 m)    Wt 228 lb (103.4 kg)    LMP  (Exact Date)    SpO2 96%    BMI 41.7 kg/m2      Constitutional: Well developed, nourished, no distress, alert, obese habitus   CV: S1, S2.  RRR. No murmurs/rubs. No thrills palpated. No carotid bruits. Intact distal pulses. No edema. Pulm: No abnormalities on inspection. Clear to auscultation bilaterally. No wheezing/rhonchi. Normal effort. GI: Soft, nontender, nondistended. Normal active bowel sounds. Neuro: A/O x 3. No focal motor or sensory deficits. Speech normal.   Psych: Appropriate affect, judgement and insight. Short-term memory intact.

## 2017-09-06 NOTE — PROGRESS NOTES
1. Have you been to the ER, urgent care clinic since your last visit? Hospitalized since your last visit? No   2. Have you seen or consulted any other health care providers outside of the [address] [place] [address] since your last visit? Include any pap smears or colon screening. Yes, dermatology, dilated eye exam Dr. Gregorio Reveles , cardiology August 2017   Pt states last INR at cardiology clinic was 2.6    Health Maintenance Due   Topic Date Due    DTaP/Tdap/Td series (1 - Tdap) 04/21/1959    ZOSTER VACCINE AGE 60>  02/21/1998    Pneumococcal 65+ Low/Medium Risk (1 of 2 - PCV13) 04/21/2003    MEDICARE YEARLY EXAM  04/21/2003    INFLUENZA AGE 9 TO ADULT  08/01/2017   Pt will consider flu vaccine.

## 2017-09-06 NOTE — MR AVS SNAPSHOT
Visit Information Date & Time Provider Department Dept. Phone Encounter #  
 9/6/2017  1:00 PM Elizabeth Velez, [facility] [phone] [id number] Upcoming Health Maintenance Date Due Pneumococcal 65+ Low/Medium Risk (1 of 2 - PCV13) 4/21/2003 MEDICARE YEARLY EXAM 4/21/2003 INFLUENZA AGE 9 TO ADULT 8/1/2017 HEMOGLOBIN A1C Q6M 1/12/2018 EYE EXAM RETINAL OR DILATED Q1 5/18/2018 MICROALBUMIN Q1 7/21/2018 LIPID PANEL Q1 7/21/2018 FOOT EXAM Q1 8/2/2018 GLAUCOMA SCREENING Q2Y 5/18/2019 DTaP/Tdap/Td series (2 - Td) 9/6/2027 Allergies as of 9/6/2017  Review Complete On: 9/6/2017 By: Marlen Desai LPN Severity Noted Reaction Type Reactions Sulfa (Sulfonamide Antibiotics)  07/12/2017    Photosensitivity Current Immunizations  Never Reviewed No immunizations on file. Not reviewed this visit You Were Diagnosed With   
  
 Codes Comments Depression with anxiety    -  Primary ICD-10-CM: F41.8 ICD-9-CM: 300.4 Paroxysmal atrial fibrillation (HCC)     ICD-10-CM: I48.0 ICD-9-CM: 427.31 Anticoagulation monitoring, INR range 2-3     ICD-10-CM: Z79.01 
ICD-9-CM: V58.61 Severe obesity (BMI >= 40) (HCC)     ICD-10-CM: E66.01 
ICD-9-CM: 278.01 Vitals BP Pulse Temp Resp Height(growth percentile) Weight(growth percentile) 160/82 (BP 1 Location: Left arm, BP Patient Position: Sitting) 77 98.2 °F (36.8 °C) (Oral) 18 5' 2\" (1.575 m) 228 lb (103.4 kg) LMP SpO2 BMI OB Status Smoking Status (Exact Date) 96% 41.7 kg/m2 Hysterectomy Never Smoker Vitals History BMI and BSA Data Body Mass Index Body Surface Area 41.7 kg/m 2 2.13 m 2 Preferred Pharmacy Pharmacy Name Phone [facility] [address] [place], [place] [address]rigo Schultz [phone] Your Updated Medication List  
  
   
This list is accurate as of: 9/6/17  1:38 PM.  Always use your most recent med list.  
  
  
  
  
 acetaminophen 500 mg tablet Commonly known as:  TYLENOL Take 500 mg by mouth every four (4) hours as needed. brinzolamide-brimonidine 1-0.2 % Drps Administer 1 Drop to both eyes two (2) times a day. CARAFATE 1 gram tablet Generic drug:  sucralfate Take 1 g by mouth four (4) times daily. cyanocobalamin 500 mcg tablet Commonly known as:  VITAMIN B12 Take 500 mcg by mouth daily. dilTIAZem SR 60 mg SR capsule Commonly known as:  CARDIZEM SR  
TAKE ONE CAPSULE BY MOUTH EVERY 12 HOURS  
  
 latanoprost 0.005 % ophthalmic solution Commonly known as:  Claudine Finders Administer 1 Drop to both eyes nightly. LOPRESSOR 50 mg tablet Generic drug:  metoprolol tartrate Take  by mouth two (2) times a day. losartan 50 mg tablet Commonly known as:  COZAAR Take  by mouth daily. metFORMIN 1,000 mg tablet Commonly known as:  GLUCOPHAGE Take 1,000 mg by mouth daily. omeprazole 40 mg capsule Commonly known as:  PRILOSEC Take 40 mg by mouth daily. oxyCODONE-acetaminophen 5-325 mg per tablet Commonly known as:  PERCOCET Take  by mouth every four (4) hours as needed for Pain. raNITIdine hcl 150 mg capsule Take 150 mg by mouth two (2) times a day. traZODone 50 mg tablet Commonly known as:  Rayma Medal Take  by mouth nightly. triamcinolone acetonide 0.1 % ointment Commonly known as:  KENALOG Apply  to affected area two (2) times a day. use thin layer  
  
 venlafaxine-SR 75 mg capsule Commonly known as:  EFFEXOR-XR Take 1 Cap by mouth daily. warfarin 7.5 mg tablet Commonly known as:  COUMADIN  
TAKE ONE TABLET BY MOUTH ONCE DAILY Prescriptions Sent to Pharmacy Refills  
 venlafaxine-SR (EFFEXOR-XR) 75 mg capsule 1 Sig: Take 1 Cap by mouth daily. Class: Normal  
 Pharmacy: [address], [place] [address] Marleny Whyte Ph #: [phone] Route: Oral  
  
We Performed the Following AMB POC PT/INR [35688 CPT(R)] Introducing hospitals & HEALTH SERVICES! Ema Zepeda introduces [email or web address] patient portal. Now you can access parts of your medical record, email your doctor's office, and request medication refills online. 1. In your internet browser, go to [email or web address] 2. Click on the First Time User? Click Here link in the Sign In box. You will see the New Member Sign Up page. 3. Enter your [employer] Access Code exactly as it appears below. You will not need to use this code after youve completed the sign-up process. If you do not sign up before the expiration date, you must request a new code. · [email or web address] Access Code: [email or web address] Expires: 10/10/2017  2:11 PM 
 
4. Enter the last four digits of your Social Security Number (xxxx) and Date of Birth (mm/dd/yyyy) as indicated and click Submit. You will be taken to the next sign-up page. 5. Create a [email or web address] ID. This will be your [email or web address] login ID and cannot be changed, so think of one that is secure and easy to remember. 6. Create a [email or web address] password. You can change your password at any time. 7. Enter your Password Reset Question and Answer. This can be used at a later time if you forget your password. 8. Enter your e-mail address. You will receive e-mail notification when new information is available in [address] 9. Click Sign Up. You can now view and download portions of your medical record. 10. Click the Download Summary menu link to download a portable copy of your medical information. If you have questions, please visit the Frequently Asked Questions section of the [email or web address] website. Remember, [email or web address] is NOT to be used for urgent needs. For medical emergencies, dial 911. Now available from your iPhone and Android! Please provide this summary of care documentation to your next provider. Your primary care clinician is listed as Jorge Sharp. If you have any questions after today's visit, please call [phone].

## 2017-10-05 DIAGNOSIS — I48.20 CHRONIC ATRIAL FIBRILLATION (HCC): ICD-10-CM

## 2017-10-05 RX ORDER — WARFARIN 7.5 MG/1
TABLET ORAL
Qty: 30 TAB | Refills: 0 | Status: SHIPPED | OUTPATIENT
Start: 2017-10-05 | End: 2017-11-02 | Stop reason: SDUPTHER

## 2017-10-06 ENCOUNTER — CLINICAL SUPPORT (OUTPATIENT)
Dept: FAMILY MEDICINE CLINIC | Age: 77
End: 2017-10-06

## 2017-10-06 VITALS
BODY MASS INDEX: 41.96 KG/M2 | SYSTOLIC BLOOD PRESSURE: 138 MMHG | HEIGHT: 62 IN | RESPIRATION RATE: 16 BRPM | WEIGHT: 228 LBS | OXYGEN SATURATION: 95 % | HEART RATE: 77 BPM | TEMPERATURE: 98.2 F | DIASTOLIC BLOOD PRESSURE: 70 MMHG

## 2017-10-06 DIAGNOSIS — I48.0 PAROXYSMAL ATRIAL FIBRILLATION (HCC): Primary | ICD-10-CM

## 2017-10-06 DIAGNOSIS — Z23 ENCOUNTER FOR IMMUNIZATION: ICD-10-CM

## 2017-10-06 LAB
INR BLD: 2
PT POC: 23.7 SECONDS
VALID INTERNAL CONTROL?: YES

## 2017-10-06 NOTE — PROGRESS NOTES
Anita Lynch is a 76 y.o. female who presents today for Anticoagulation monitoring. Indication: Atrial Fibrillation  INR Goal: 2.0-3.0. Current dose:  Coumadin 7.5 mg daily. Missed Coumadin Doses:  None  Medication Changes:  no  Dietary Changes:  no    Symptoms: taking coumadin appropriately without any bleeding. Latest INRs:  Lab Results   Component Value Date/Time    INR POC 2.0 10/06/2017 01:14 PM    INR POC 2.7 09/06/2017 01:25 PM    INR POC 2.9 08/02/2017 03:07 PM        New Coumadin dose:.current treatment plan is effective, no change in therapy. Next check to be scheduled for  4 weeks.

## 2017-11-02 DIAGNOSIS — I48.20 CHRONIC ATRIAL FIBRILLATION (HCC): ICD-10-CM

## 2017-11-02 RX ORDER — WARFARIN 7.5 MG/1
TABLET ORAL
Qty: 30 TAB | Refills: 0 | Status: SHIPPED | OUTPATIENT
Start: 2017-11-02 | End: 2017-11-30 | Stop reason: SDUPTHER

## 2017-11-06 ENCOUNTER — CLINICAL SUPPORT (OUTPATIENT)
Dept: FAMILY MEDICINE CLINIC | Age: 77
End: 2017-11-06

## 2017-11-06 VITALS
OXYGEN SATURATION: 94 % | HEIGHT: 62 IN | BODY MASS INDEX: 41.96 KG/M2 | SYSTOLIC BLOOD PRESSURE: 140 MMHG | HEART RATE: 78 BPM | RESPIRATION RATE: 22 BRPM | DIASTOLIC BLOOD PRESSURE: 82 MMHG | WEIGHT: 228 LBS | TEMPERATURE: 98.4 F

## 2017-11-06 DIAGNOSIS — I48.91 ATRIAL FIBRILLATION, UNSPECIFIED TYPE (HCC): Primary | ICD-10-CM

## 2017-11-06 NOTE — PROGRESS NOTES
Too Aleman is a 76 y.o. female who presents today for Anticoagulation monitoring. Indication: Atrial Fibrillation  INR Goal: 2.0-3.0. Current dose:  Coumadin  7.5 mg daily. Missed Coumadin Doses:  None  Medication Changes:  no  Dietary Changes:  no    Symptoms: taking coumadin appropriately without any bleeding. Latest INRs:  Lab Results   Component Value Date/Time    INR POC 2.0 10/06/2017 01:14 PM    INR POC 2.7 09/06/2017 01:25 PM    INR POC 2.9 08/02/2017 03:07 PM        New Coumadin dose:.current treatment plan is effective, no change in therapy. Next check to be scheduled for  4 weeks.

## 2017-11-30 DIAGNOSIS — I48.20 CHRONIC ATRIAL FIBRILLATION (HCC): ICD-10-CM

## 2017-11-30 RX ORDER — WARFARIN 7.5 MG/1
TABLET ORAL
Qty: 30 TAB | Refills: 0 | Status: SHIPPED | OUTPATIENT
Start: 2017-11-30 | End: 2017-12-28 | Stop reason: SDUPTHER

## 2017-12-04 ENCOUNTER — OFFICE VISIT (OUTPATIENT)
Dept: FAMILY MEDICINE CLINIC | Age: 77
End: 2017-12-04

## 2017-12-04 VITALS
HEIGHT: 62 IN | TEMPERATURE: 98.4 F | HEART RATE: 81 BPM | BODY MASS INDEX: 41.37 KG/M2 | DIASTOLIC BLOOD PRESSURE: 89 MMHG | OXYGEN SATURATION: 96 % | WEIGHT: 224.8 LBS | SYSTOLIC BLOOD PRESSURE: 130 MMHG | RESPIRATION RATE: 20 BRPM

## 2017-12-04 DIAGNOSIS — M54.50 CHRONIC BILATERAL LOW BACK PAIN WITHOUT SCIATICA: Primary | ICD-10-CM

## 2017-12-04 DIAGNOSIS — I48.91 ATRIAL FIBRILLATION, UNSPECIFIED TYPE (HCC): ICD-10-CM

## 2017-12-04 DIAGNOSIS — Z00.00 INITIAL MEDICARE ANNUAL WELLNESS VISIT: ICD-10-CM

## 2017-12-04 DIAGNOSIS — R29.6 RECURRENT FALLS: ICD-10-CM

## 2017-12-04 DIAGNOSIS — G89.29 CHRONIC BILATERAL LOW BACK PAIN WITHOUT SCIATICA: Primary | ICD-10-CM

## 2017-12-04 DIAGNOSIS — E11.9 TYPE 2 DIABETES MELLITUS WITHOUT COMPLICATION, WITHOUT LONG-TERM CURRENT USE OF INSULIN (HCC): ICD-10-CM

## 2017-12-04 LAB
HBA1C MFR BLD HPLC: 6.5 %
INR BLD: 2.4
PT POC: 29.3 SECONDS
VALID INTERNAL CONTROL?: YES

## 2017-12-04 RX ORDER — MELATONIN
1000 DAILY
COMMUNITY
Start: 2017-12-04 | End: 2019-01-28 | Stop reason: ALTCHOICE

## 2017-12-04 RX ORDER — OXYCODONE AND ACETAMINOPHEN 5; 325 MG/1; MG/1
1 TABLET ORAL
Qty: 30 TAB | Refills: 0 | Status: SHIPPED | OUTPATIENT
Start: 2017-12-04 | End: 2018-03-05 | Stop reason: ALTCHOICE

## 2017-12-04 NOTE — PROGRESS NOTES
Laron Betts is a 76 y.o. female (: 1940) presenting to address:    Chief Complaint   Patient presents with    Annual Wellness Visit    Anticoagulation       Vitals:    17 1331   BP: 130/89   Pulse: 81   Resp: 20   Temp: 98.4 °F (36.9 °C)   TempSrc: Oral   SpO2: 96%   Weight: 224 lb 12.8 oz (102 kg)   Height: 5' 2\" (1.575 m)   PainSc:   8   PainLoc: Generalized       Hearing/Vision:      Visual Acuity Screening    Right eye Left eye Both eyes   Without correction:      With correction: 20/50 20/200 20/20       Learning Assessment:   No flowsheet data found. Depression Screening:     PHQ over the last two weeks 2017   PHQ Not Done Active Diagnosis of Depression or Bipolar Disorder   Little interest or pleasure in doing things -   Feeling down, depressed or hopeless -   Total Score PHQ 2 -   Trouble falling or staying asleep, or sleeping too much -   Feeling tired or having little energy -   Poor appetite or overeating -   Feeling bad about yourself - or that you are a failure or have let yourself or your family down -   Trouble concentrating on things such as school, work, reading or watching TV -   Moving or speaking so slowly that other people could have noticed; or the opposite being so fidgety that others notice -   Thoughts of being better off dead, or hurting yourself in some way -   PHQ 9 Score -   How difficult have these problems made it for you to do your work, take care of your home and get along with others -     Fall Risk Assessment:     Fall Risk Assessment, last 12 mths 2017   Able to walk? Yes   Fall in past 12 months? Yes   Fall with injury? Yes   Number of falls in past 12 months 1   Fall Risk Score 2     Abuse Screening:     Abuse Screening Questionnaire 2017   Do you ever feel afraid of your partner? N   Are you in a relationship with someone who physically or mentally threatens you? N   Is it safe for you to go home? Y     Coordination of Care Questionaire:   1.  Have you been to the ER, urgent care clinic since your last visit? Hospitalized since your last visit? NO    2. Have you seen or consulted any other health care providers outside of the [facility] since your last visit? Include any pap smears or colon screening. NO    Advanced Directive:   1. Do you have an Advanced Directive? YES    2. Would you like information on Advanced Directives?  NO

## 2017-12-04 NOTE — MR AVS SNAPSHOT
Visit Information Date & Time Provider Department Dept. Phone Encounter #  
 12/4/2017  1:30 PM Valarie Rawls [facility] [phone] Your Appointments 1/4/2018  1:00 PM  
Follow Up with Valarie Rawls MD  
[facility] [address] [facility]) Appt Note: 4 month f/u; r/s for 30 mins; r/s back to 15 mins- Gcode not needed [address] [facility] [address] [phone]  
  
   
 [address] [facility] [address] [facility] Upcoming Health Maintenance Date Due HEMOGLOBIN A1C Q6M 6/4/2018 MICROALBUMIN Q1 7/21/2018 LIPID PANEL Q1 7/21/2018 FOOT EXAM Q1 8/2/2018 EYE EXAM RETINAL OR DILATED Q1 8/3/2018 Pneumococcal 65+ Low/Medium Risk (2 of 2 - PPSV23) 12/4/2018 MEDICARE YEARLY EXAM 12/5/2018 GLAUCOMA SCREENING Q2Y 8/3/2019 DTaP/Tdap/Td series (2 - Td) 9/6/2027 Allergies as of 12/4/2017  Review Complete On: 12/4/2017 By: Valarie Rawls MD  
  
 Severity Noted Reaction Type Reactions Sulfa (Sulfonamide Antibiotics)  07/12/2017    Photosensitivity Current Immunizations  Never Reviewed Name Date Influenza High Dose Vaccine PF 10/6/2017  1:17 PM  
  
 Not reviewed this visit You Were Diagnosed With   
  
 Codes Comments Atrial fibrillation, unspecified type ([facility]    -  Primary ICD-10-CM: I48.91 
ICD-9-CM: 427.31 Type 2 diabetes mellitus without complication, without long-term current use of insulin (HCC)     ICD-10-CM: E11.9 ICD-9-CM: 250.00 Initial Medicare annual wellness visit     ICD-10-CM: Z00.00 ICD-9-CM: V70.0 Recurrent falls     ICD-10-CM: R29.6 ICD-9-CM: V15.88 Chronic bilateral low back pain without sciatica     ICD-10-CM: M54.5, G89.29 ICD-9-CM: 724.2, 338.29 Vitals BP Pulse Temp Resp Height(growth percentile) Weight(growth percentile)  130/89 (BP 1 Location: Left arm, BP Patient Position: Sitting) 81 98.4 °F (36.9 °C) (Oral) 20 5' 2\" (1.575 m) 224 lb 12.8 oz (102 kg) LMP SpO2 BMI OB Status Smoking Status (Exact Date) 96% 41.12 kg/m2 Hysterectomy Never Smoker Vitals History BMI and BSA Data Body Mass Index Body Surface Area  
 41.12 kg/m 2 2.11 m 2 Preferred Pharmacy Pharmacy Name Phone [facility] [address] [place], [place] [address]e [place] [phone] Your Updated Medication List  
  
   
This list is accurate as of: 12/4/17  1:57 PM.  Always use your most recent med list.  
  
  
  
  
 acetaminophen 500 mg tablet Commonly known as:  TYLENOL Take 500 mg by mouth every four (4) hours as needed. brinzolamide-brimonidine 1-0.2 % Drps Administer 1 Drop to both eyes two (2) times a day. CARAFATE 1 gram tablet Generic drug:  sucralfate Take 1 g by mouth four (4) times daily. cholecalciferol 1,000 unit tablet Commonly known as:  VITAMIN D3 Take 1 Tab by mouth daily. cyanocobalamin 500 mcg tablet Commonly known as:  VITAMIN B12 Take 500 mcg by mouth daily. dilTIAZem SR 60 mg SR capsule Commonly known as:  CARDIZEM SR  
TAKE ONE CAPSULE BY MOUTH EVERY 12 HOURS  
  
 latanoprost 0.005 % ophthalmic solution Commonly known as:  Verta Piety Administer 1 Drop to both eyes nightly. LOPRESSOR 50 mg tablet Generic drug:  metoprolol tartrate Take  by mouth two (2) times a day. losartan 50 mg tablet Commonly known as:  COZAAR Take  by mouth daily. metFORMIN 1,000 mg tablet Commonly known as:  GLUCOPHAGE Take 1,000 mg by mouth daily. omeprazole 40 mg capsule Commonly known as:  PRILOSEC Take 40 mg by mouth daily. oxyCODONE-acetaminophen 5-325 mg per tablet Commonly known as:  PERCOCET Take 1 Tab by mouth daily as needed for Pain. raNITIdine hcl 150 mg capsule Take 150 mg by mouth two (2) times a day. traZODone 50 mg tablet Commonly known as:  Palma Lesley  
 Take  by mouth nightly. triamcinolone acetonide 0.1 % ointment Commonly known as:  KENALOG Apply  to affected area two (2) times a day. use thin layer  
  
 venlafaxine-SR 75 mg capsule Commonly known as:  EFFEXOR-XR Take 1 Cap by mouth daily. warfarin 7.5 mg tablet Commonly known as:  COUMADIN  
TAKE ONE TABLET BY MOUTH ONCE DAILY Prescriptions Printed Refills  
 oxyCODONE-acetaminophen (PERCOCET) 5-325 mg per tablet 0 Sig: Take 1 Tab by mouth daily as needed for Pain. Class: Print Route: Oral  
  
We Performed the Following AMB POC HEMOGLOBIN A1C [42815 CPT(R)] AMB POC PT/INR [44915 CPT(R)] REFERRAL TO PAIN MANAGEMENT [ICC658 Custom] Referral Information Referral ID Referred By Referred To  
  
 [id number] Rosa Maria Fernández MD   
   [address] [facility] Phone: [phone] Fax: [phone] Visits Status Start Date End Date 1 New Request 12/4/17 12/4/18 If your referral has a status of pending review or denied, additional information will be sent to support the outcome of this decision. Patient Instructions Medicare Wellness Visit, Female The best way to live healthy is to have a healthy lifestyle by eating a well-balanced diet, exercising regularly, limiting alcohol and stopping smoking. Regular physical exams and screening tests are another way to keep healthy. Preventive exams provided by your health care provider can find health problems before they become diseases or illnesses. Preventive services including immunizations, screening tests, monitoring and exams can help you take care of your own health. All people over age 72 should have a pneumovax  and and a prevnar shot to prevent pneumonia. These are once in a lifetime unless you and your provider decide differently.  
 
All people over 65 should have a yearly flu shot and a tetanus vaccine every 10 years. A bone mass density to screen for osteoporosis or thinning of the bones should be done every 2 years after 65. Screening for diabetes mellitus with a blood sugar test should be done every year. Glaucoma is a disease of the eye due to increased ocular pressure that can lead to blindness and it should be done every year by an eye professional. 
 
Cardiovascular screening tests that check for elevated lipids (fatty part of blood) which can lead to heart disease and strokes should be done every 5 years. Colorectal screening that evaluates for blood or polyps in your colon should be done yearly as a stool test or every five years as a flexible sigmoidoscope or every 10 years as a colonoscopy up to age 76. Breast cancer screening with a mammogram is recommended biennially  for women age 54-69. Screening for cervical cancer with a pap smear and pelvic exam is recommended for women after age 72 years every 2 years up to age 79 or when the provider and patient decide to stop. If there is a history of cervical abnormalities or other increased risk for cancer then the test is recommended yearly. Hepatitis C screening is also recommended for anyone born between [birth year] through [address]. A shingles vaccine is also recommended once in a lifetime after age 61. Your Medicare Wellness Exam is recommended annually. Here is a list of your current Health Maintenance items with a due date: 
Health Maintenance Due Topic Date Due  Pneumococcal Vaccine (1 of 2 - PCV13) 04/21/2003 [facility] Annual Well Visit  04/21/2003 Introducing \[facility]\"" & HEALTH SERVICES! Felipa Álvarez introduces [employer] patient portal. Now you can access parts of your medical record, email your doctor's office, and request medication refills online. 1. In your internet browser, go to [email or web address] 2. Click on the First Time User? Click Here link in the Sign In box.  You will see the New Member Sign Up page. 3. Enter your [employer] Access Code exactly as it appears below. You will not need to use this code after youve completed the sign-up process. If you do not sign up before the expiration date, you must request a new code. · [employer] Access Code: [email or web address] Expires: 3/4/2018  1:57 PM 
 
4. Enter the last four digits of your Social Security Number (xxxx) and Date of Birth (mm/dd/yyyy) as indicated and click Submit. You will be taken to the next sign-up page. 5. Create a [employer] ID. This will be your [employer] login ID and cannot be changed, so think of one that is secure and easy to remember. 6. Create a [employer] password. You can change your password at any time. 7. Enter your Password Reset Question and Answer. This can be used at a later time if you forget your password. 8. Enter your e-mail address. You will receive e-mail notification when new information is available in [address] 9. Click Sign Up. You can now view and download portions of your medical record. 10. Click the Download Summary menu link to download a portable copy of your medical information. If you have questions, please visit the Frequently Asked Questions section of the [employer] website. Remember, [employer] is NOT to be used for urgent needs. For medical emergencies, dial 911. Now available from your iPhone and Android! Please provide this summary of care documentation to your next provider. Your primary care clinician is listed as Jorge 13. If you have any questions after today's visit, please call [phone].

## 2017-12-04 NOTE — PATIENT INSTRUCTIONS

## 2017-12-06 PROBLEM — M51.35 DDD (DEGENERATIVE DISC DISEASE), THORACOLUMBAR: Status: ACTIVE | Noted: 2017-12-06

## 2017-12-28 DIAGNOSIS — I48.20 CHRONIC ATRIAL FIBRILLATION (HCC): ICD-10-CM

## 2017-12-29 RX ORDER — WARFARIN 7.5 MG/1
TABLET ORAL
Qty: 30 TAB | Refills: 0 | Status: SHIPPED | OUTPATIENT
Start: 2017-12-29 | End: 2018-02-01 | Stop reason: SDUPTHER

## 2018-01-02 DIAGNOSIS — G89.29 CHRONIC BILATERAL LOW BACK PAIN WITHOUT SCIATICA: ICD-10-CM

## 2018-01-02 DIAGNOSIS — M54.50 CHRONIC BILATERAL LOW BACK PAIN WITHOUT SCIATICA: ICD-10-CM

## 2018-01-02 RX ORDER — OXYCODONE AND ACETAMINOPHEN 5; 325 MG/1; MG/1
1 TABLET ORAL
Qty: 30 TAB | Refills: 0 | OUTPATIENT
Start: 2018-01-02

## 2018-01-02 NOTE — TELEPHONE ENCOUNTER
Pt called to get contact info for pain management doctor she was referred to. She states they have not contacted her yet. I gave Ms. Smith the info for Dr. Mynor العراقي office and she says she will call them to get scheduled.

## 2018-01-03 ENCOUNTER — CLINICAL SUPPORT (OUTPATIENT)
Dept: FAMILY MEDICINE CLINIC | Age: 78
End: 2018-01-03

## 2018-01-03 VITALS
RESPIRATION RATE: 20 BRPM | DIASTOLIC BLOOD PRESSURE: 82 MMHG | SYSTOLIC BLOOD PRESSURE: 156 MMHG | TEMPERATURE: 98.2 F | BODY MASS INDEX: 41.22 KG/M2 | HEART RATE: 82 BPM | HEIGHT: 62 IN | OXYGEN SATURATION: 97 % | WEIGHT: 224 LBS

## 2018-01-03 DIAGNOSIS — I48.20 CHRONIC ATRIAL FIBRILLATION (HCC): Primary | ICD-10-CM

## 2018-01-03 LAB
INR BLD: 2.1
PT POC: 25.5 SECONDS
VALID INTERNAL CONTROL?: YES

## 2018-01-03 NOTE — PROGRESS NOTES
Sera Samuel is a 76 y.o. female who presents today for Anticoagulation monitoring. Indication: Atrial Fibrillation  INR Goal: 2.0-3.0. Current dose:  Coumadin 7.5mg daily. Missed Coumadin Doses:  None  Medication Changes:  no  Dietary Changes:  no    Symptoms: taking coumadin appropriately without any bleeding. Latest INRs:  Lab Results   Component Value Date/Time    INR POC 2.1 01/03/2018 02:03 PM    INR POC 2.4 12/04/2017 01:37 PM    INR POC 2.0 10/06/2017 01:14 PM        New Coumadin dose:.current treatment plan is effective, no change in therapy. Next check to be scheduled for  4 weeks.

## 2018-01-10 RX ORDER — DILTIAZEM HYDROCHLORIDE 360 MG/1
360 CAPSULE, EXTENDED RELEASE ORAL DAILY
COMMUNITY
Start: 2018-01-10 | End: 2018-07-12 | Stop reason: SDUPTHER

## 2018-02-01 DIAGNOSIS — I48.20 CHRONIC ATRIAL FIBRILLATION (HCC): ICD-10-CM

## 2018-02-02 RX ORDER — WARFARIN 7.5 MG/1
TABLET ORAL
Qty: 30 TAB | Refills: 0 | Status: SHIPPED | OUTPATIENT
Start: 2018-02-02 | End: 2018-03-06 | Stop reason: SDUPTHER

## 2018-02-05 ENCOUNTER — CLINICAL SUPPORT (OUTPATIENT)
Dept: FAMILY MEDICINE CLINIC | Age: 78
End: 2018-02-05

## 2018-02-05 VITALS
HEIGHT: 62 IN | SYSTOLIC BLOOD PRESSURE: 120 MMHG | OXYGEN SATURATION: 97 % | HEART RATE: 82 BPM | TEMPERATURE: 98.4 F | BODY MASS INDEX: 40.56 KG/M2 | RESPIRATION RATE: 20 BRPM | DIASTOLIC BLOOD PRESSURE: 70 MMHG | WEIGHT: 220.4 LBS

## 2018-02-05 DIAGNOSIS — E66.01 SEVERE OBESITY (BMI >= 40) (HCC): ICD-10-CM

## 2018-02-05 DIAGNOSIS — E11.9 TYPE 2 DIABETES MELLITUS WITHOUT COMPLICATION, WITHOUT LONG-TERM CURRENT USE OF INSULIN (HCC): ICD-10-CM

## 2018-02-05 DIAGNOSIS — I48.20 CHRONIC ATRIAL FIBRILLATION (HCC): Primary | ICD-10-CM

## 2018-02-05 LAB
INR BLD: 2.5
PT POC: 29.6 SECONDS
VALID INTERNAL CONTROL?: YES

## 2018-02-05 NOTE — PROGRESS NOTES
Sachin Durant is a 76 y.o. female who presents today for Anticoagulation monitoring. Indication: Atrial Fibrillation  INR Goal: 2.0-3.0. Current dose:  Coumadin 7.5 mg daily. Missed Coumadin Doses:  None  Medication Changes:  no  Dietary Changes:  no    Symptoms: taking coumadin appropriately without any bleeding. Latest INRs:  Lab Results   Component Value Date/Time    INR POC 2.5 02/05/2018 11:02 AM    INR POC 2.1 01/03/2018 02:03 PM    INR POC 2.4 12/04/2017 01:37 PM        New Coumadin dose:.current treatment plan is effective, no change in therapy. Next check to be scheduled for  1 month.

## 2018-03-05 ENCOUNTER — OFFICE VISIT (OUTPATIENT)
Dept: FAMILY MEDICINE CLINIC | Age: 78
End: 2018-03-05

## 2018-03-05 VITALS
HEART RATE: 81 BPM | RESPIRATION RATE: 20 BRPM | TEMPERATURE: 98.2 F | BODY MASS INDEX: 40.67 KG/M2 | WEIGHT: 221 LBS | HEIGHT: 62 IN | SYSTOLIC BLOOD PRESSURE: 140 MMHG | OXYGEN SATURATION: 97 % | DIASTOLIC BLOOD PRESSURE: 80 MMHG

## 2018-03-05 DIAGNOSIS — I48.20 CHRONIC ATRIAL FIBRILLATION (HCC): Primary | ICD-10-CM

## 2018-03-05 DIAGNOSIS — R19.7 DIARRHEA, UNSPECIFIED TYPE: ICD-10-CM

## 2018-03-05 DIAGNOSIS — I10 ESSENTIAL HYPERTENSION: ICD-10-CM

## 2018-03-05 DIAGNOSIS — Z79.01 ANTICOAGULATION MONITORING, INR RANGE 2-3: ICD-10-CM

## 2018-03-05 DIAGNOSIS — F41.8 DEPRESSION WITH ANXIETY: ICD-10-CM

## 2018-03-05 DIAGNOSIS — E11.9 TYPE 2 DIABETES MELLITUS WITHOUT COMPLICATION, WITHOUT LONG-TERM CURRENT USE OF INSULIN (HCC): ICD-10-CM

## 2018-03-05 LAB
HBA1C MFR BLD HPLC: 6.3 %
INR BLD: 2.3
PT POC: 27 SECONDS
VALID INTERNAL CONTROL?: YES

## 2018-03-05 RX ORDER — VENLAFAXINE HYDROCHLORIDE 150 MG/1
150 CAPSULE, EXTENDED RELEASE ORAL DAILY
Qty: 90 CAP | Refills: 0 | Status: SHIPPED | OUTPATIENT
Start: 2018-03-05 | End: 2018-05-07 | Stop reason: ALTCHOICE

## 2018-03-05 NOTE — PROGRESS NOTES
Assessment/Plan:    1. Chronic atrial fibrillation (HCC) and Anticoagulation monitoring, INR range 2-3  -INR 2.3. Cont same regimen. F/u in 1mo for INR  - AMB POC PT/INR    2. Essential hypertension  -cont same. Usually better controlled. Monitor. 3. Type 2 diabetes mellitus without complication, without long-term current use of insulin (HCC)  -A1c 6.8. Cont same.  - AMB POC HEMOGLOBIN A1C    4, Diarrhea, unspecified type with fecal incontinence - ck c diff. Trial metamucil or other bulking agent. She reports 'thorough w/u in [place] several years back that was nml\"  - C. DIFFICILE/EPI PCR ([facility] Only); Future    5. Depression with anxiety  -incr dose to 150mg. Contracted for safety. - venlafaxine-SR (EFFEXOR-XR) 150 mg capsule; Take 1 Cap by mouth daily. Dispense: 90 Cap; Refill: 0    The plan was discussed with the patient. The patient verbalized understanding and is in agreement with the plan. All medication potential side effects were discussed with the patient. Health Maintenance:   Health Maintenance   Topic Date Due    HEMOGLOBIN A1C Q6M  06/04/2018    MICROALBUMIN Q1  07/21/2018    LIPID PANEL Q1  07/21/2018    FOOT EXAM Q1  08/02/2018    EYE EXAM RETINAL OR DILATED Q1  08/03/2018    Pneumococcal 65+ Low/Medium Risk (2 of 2 - PPSV23) 12/04/2018    MEDICARE YEARLY EXAM  12/05/2018    GLAUCOMA SCREENING Q2Y  08/03/2019    DTaP/Tdap/Td series (2 - Td) 09/06/2027    OSTEOPOROSIS SCREENING (DEXA)  Completed    ZOSTER VACCINE AGE 60>  Addressed    Influenza Age 5 to Adult  Completed       Daysi Lazo is a 76 y.o. female and presents with Hypertension; Anticoagulation; Depression; and Medication Refill     Subjective:  HTN - slightly higher today than previous. States cards changed dilt to long-acting. Afib - on coumadin. INR is 2.3. Pt c/o \"having a lot of diarrhea\" x 6 mos. She seems to alternate with constipation. Her bm where stool is watery and thin.  She will have associated cramping. Seems to happen after eating, but hasn't identified food triggers. She has episodes of stool incontinence. No blood in stool. Sometimes she will have 3-4 stools/day. She has been seen by GI in [place] for \"stomach issues\" and dx with gastritis in 2012/2013, treated with carafate. She is requesting to increase her antidepressant. States she's felt depressed x 2 mos. Started after the loss of her dog. She states she doesn't want to go anywhere, crying more often. No SI/HI. +access to firearms (but states she doesn't know how to use them). ROS:  Constitutional: No recent weight change. No weakness/fatigue. No f/c. Cardiovascular: No CP/palpitations. No CHO/orthopnea/PND. Respiratory: No cough/sputum, dyspnea, wheezing. Gastointestinal: No dysphagia, reflux. No n/v.  + constipation/+diarrhea. No melena/rectal bleeding. Genitourinary: No dysuria, urinary hesitancy, nocturia, hematuria. No incontinence. The problem list was updated as a part of today's visit. Patient Active Problem List   Diagnosis Code    Osteopenia of multiple sites M85.89    ANA CRISTINA (obstructive sleep apnea) G47.33    A-fib ([facility]) I48.91    Right bundle branch block (RBBB) I45.10    Type 2 diabetes mellitus without complication, without long-term current use of insulin ([facility]) E11.9    Anticoagulation monitoring, INR range 2-3 Z79.01    Wheezing R06.2    Depression with anxiety F41.8    Severe obesity (BMI >= 40) ([facility]) E66.01    Essential hypertension I10    DDD (degenerative disc disease), thoracolumbar M51.35       The PSH, FH were reviewed.     SH:  Social History   Substance Use Topics    Smoking status: Never Smoker    Smokeless tobacco: Never Used    Alcohol use No       Medications/Allergies:  Current Outpatient Prescriptions on File Prior to Visit   Medication Sig Dispense Refill    warfarin (COUMADIN) 7.5 mg tablet TAKE ONE TABLET BY MOUTH ONCE DAILY 30 Tab 0    dilTIAZem (TIAZAC) 360 mg ER capsule Take 1 Cap by mouth daily.  cholecalciferol (VITAMIN D3) 1,000 unit tablet Take 1 Tab by mouth daily.  venlafaxine-SR (EFFEXOR-XR) 75 mg capsule Take 1 Cap by mouth daily. 90 Cap 1    metoprolol tartrate (LOPRESSOR) 50 mg tablet Take  by mouth two (2) times a day.  acetaminophen (TYLENOL) 500 mg tablet Take 500 mg by mouth every four (4) hours as needed.  brinzolamide-brimonidine 1-0.2 % drps Administer 1 Drop to both eyes two (2) times a day.  latanoprost (XALATAN) 0.005 % ophthalmic solution Administer 1 Drop to both eyes nightly.  metFORMIN (GLUCOPHAGE) 1,000 mg tablet Take 1,000 mg by mouth daily.  losartan (COZAAR) 50 mg tablet Take  by mouth daily.  omeprazole (PRILOSEC) 40 mg capsule Take 40 mg by mouth daily.  raNITIdine hcl 150 mg capsule Take 150 mg by mouth two (2) times a day.  sucralfate (CARAFATE) 1 gram tablet Take 1 g by mouth four (4) times daily.  traZODone (DESYREL) 50 mg tablet Take  by mouth nightly.  cyanocobalamin (VITAMIN B12) 500 mcg tablet Take 500 mcg by mouth daily.  triamcinolone acetonide (KENALOG) 0.1 % ointment Apply  to affected area two (2) times a day. use thin layer 30 g 0     No current facility-administered medications on file prior to visit. Allergies   Allergen Reactions    Sulfa (Sulfonamide Antibiotics) Photosensitivity       Objective:  Visit Vitals    /80 (BP 1 Location: Left arm, BP Patient Position: Sitting)    Pulse 81    Temp 98.2 °F (36.8 °C) (Oral)    Resp 20    Ht 5' 2\" (1.575 m)    Wt 221 lb (100.2 kg)    SpO2 97%    BMI 40.42 kg/m2      Constitutional: Well developed, nourished, no distress, alert, obese habitus   CV: S1, S2.  RRR. No murmurs/rubs. No thrills palpated. No carotid bruits. Intact distal pulses. No edema. Pulm: No abnormalities on inspection. Clear to auscultation bilaterally. No wheezing/rhonchi. Normal effort.      GI: Soft, nontender, nondistended. Normal active bowel sounds. Neuro: A/O x 3. No focal motor or sensory deficits. Speech normal.   Psych: Appropriate affect, judgement and insight. Short-term memory intact. Labwork and Ancillary Studies:    CBC w/Diff  Lab Results   Component Value Date/Time    WBC 6.3 07/21/2017 09:56 AM    HGB 11.3 07/21/2017 09:56 AM    PLATELET 498 70/52/6061 09:56 AM         Basic Metabolic Profile/LFTs  Lab Results   Component Value Date/Time    Sodium 142 07/21/2017 09:56 AM    Potassium 4.4 07/21/2017 09:56 AM    Chloride 101 07/21/2017 09:56 AM    CO2 24 07/21/2017 09:56 AM    Glucose 140 (H) 07/21/2017 09:56 AM    BUN 11 07/21/2017 09:56 AM    Creatinine 0.76 07/21/2017 09:56 AM    BUN/Creatinine ratio 14 07/21/2017 09:56 AM    GFR est AA 86 07/21/2017 09:56 AM    GFR est non-AA 75 07/21/2017 09:56 AM    Calcium 10.1 07/21/2017 09:56 AM      Lab Results   Component Value Date/Time    ALT (SGPT) 17 07/21/2017 09:56 AM    AST (SGOT) 32 07/21/2017 09:56 AM    Alk.  phosphatase 114 07/21/2017 09:56 AM    Bilirubin, total 0.3 07/21/2017 09:56 AM       Cholesterol  Lab Results   Component Value Date/Time    Cholesterol, total 187 07/21/2017 09:56 AM    HDL Cholesterol 46 07/21/2017 09:56 AM    LDL, calculated 103 (H) 07/21/2017 09:56 AM    Triglyceride 189 (H) 07/21/2017 09:56 AM

## 2018-03-05 NOTE — PROGRESS NOTES
Perla Rubinstein is a 76 y.o. female (: 1940) presenting to address:    Chief Complaint   Patient presents with    Hypertension    Anticoagulation    Depression    Medication Refill       Vitals:    18 1103   BP: 140/80   Pulse: 81   Resp: 20   Temp: 98.2 °F (36.8 °C)   TempSrc: Oral   SpO2: 97%   Weight: 221 lb (100.2 kg)   Height: 5' 2\" (1.575 m)   PainSc:   6   PainLoc: Generalized         Depression Screening:     PHQ over the last two weeks 2017   PHQ Not Done Active Diagnosis of Depression or Bipolar Disorder   Little interest or pleasure in doing things -   Feeling down, depressed or hopeless -   Total Score PHQ 2 -   Trouble falling or staying asleep, or sleeping too much -   Feeling tired or having little energy -   Poor appetite or overeating -   Feeling bad about yourself - or that you are a failure or have let yourself or your family down -   Trouble concentrating on things such as school, work, reading or watching TV -   Moving or speaking so slowly that other people could have noticed; or the opposite being so fidgety that others notice -   Thoughts of being better off dead, or hurting yourself in some way -   PHQ 9 Score -   How difficult have these problems made it for you to do your work, take care of your home and get along with others -     Fall Risk Assessment:     Fall Risk Assessment, last 12 mths 2017   Able to walk? Yes   Fall in past 12 months? Yes   Fall with injury? Yes   Number of falls in past 12 months 1   Fall Risk Score 2     Abuse Screening:     Abuse Screening Questionnaire 2017   Do you ever feel afraid of your partner? N   Are you in a relationship with someone who physically or mentally threatens you? N   Is it safe for you to go home? Y     Coordination of Care Questionaire:   1. Have you been to the ER, urgent care clinic since your last visit? Hospitalized since your last visit? NO    2.  Have you seen or consulted any other health care providers outside of the [facility] since your last visit? Include any pap smears or colon screening. NO    Advanced Directive:   1. Do you have an Advanced Directive? YES    2. Would you like information on Advanced Directives?  NO

## 2018-03-05 NOTE — MR AVS SNAPSHOT
nAahi Young 
 
 
 [address] [address] [facility] [id number] [phone] Patient: Deandre Recio MRN: [id number] TNF:[birth year] Visit Information Date & Time Provider Department Dept. Phone Encounter #  
 3/5/2018 11:00 AM Magnolia Gomez, [address] [place] [phone] [id number] Follow-up Instructions Return in about 3 months (around 6/5/2018). Upcoming Health Maintenance Date Due HEMOGLOBIN A1C Q6M 6/4/2018 MICROALBUMIN Q1 7/21/2018 LIPID PANEL Q1 7/21/2018 FOOT EXAM Q1 8/2/2018 EYE EXAM RETINAL OR DILATED Q1 8/3/2018 Pneumococcal 65+ Low/Medium Risk (2 of 2 - PPSV23) 12/4/2018 MEDICARE YEARLY EXAM 12/5/2018 GLAUCOMA SCREENING Q2Y 8/3/2019 DTaP/Tdap/Td series (2 - Td) 9/6/2027 Allergies as of 3/5/2018  Review Complete On: 3/5/2018 By: Magnolia Gomez MD  
  
 Severity Noted Reaction Type Reactions Sulfa (Sulfonamide Antibiotics)  07/12/2017    Photosensitivity Current Immunizations  Never Reviewed Name Date Influenza High Dose Vaccine PF 10/6/2017  1:17 PM  
  
 Not reviewed this visit You Were Diagnosed With   
  
 Codes Comments Chronic atrial fibrillation (HCC)    -  Primary ICD-10-CM: M30.8 ICD-9-CM: 427.31 Anticoagulation monitoring, INR range 2-3     ICD-10-CM: Z79.01 
ICD-9-CM: V58.61 Essential hypertension     ICD-10-CM: I10 
ICD-9-CM: 401.9 Type 2 diabetes mellitus without complication, without long-term current use of insulin (HCC)     ICD-10-CM: E11.9 ICD-9-CM: 250.00 Diarrhea, unspecified type     ICD-10-CM: R19.7 ICD-9-CM: 787.91 Depression with anxiety     ICD-10-CM: F41.8 ICD-9-CM: 300.4 Vitals BP Pulse Temp Resp Height(growth percentile) Weight(growth percentile) 140/80 (BP 1 Location: Left arm, BP Patient Position: Sitting) 81 98.2 °F (36.8 °C) (Oral) 20 5' 2\" (1.575 m) 221 lb (100.2 kg) SpO2 BMI OB Status Smoking Status 97% 40.42 kg/m2 Hysterectomy Never Smoker Vitals History BMI and BSA Data Body Mass Index Body Surface Area 40.42 kg/m 2 2.09 m 2 Preferred Pharmacy Pharmacy Name Phone [address] [phone] Your Updated Medication List  
  
   
This list is accurate as of 3/5/18 11:30 AM.  Always use your most recent med list.  
  
  
  
  
 acetaminophen 500 mg tablet Commonly known as:  TYLENOL Take 500 mg by mouth every four (4) hours as needed. brinzolamide-brimonidine 1-0.2 % Drps Administer 1 Drop to both eyes two (2) times a day. CARAFATE 1 gram tablet Generic drug:  sucralfate Take 1 g by mouth four (4) times daily. cholecalciferol 1,000 unit tablet Commonly known as:  VITAMIN D3 Take 1 Tab by mouth daily. cyanocobalamin 500 mcg tablet Commonly known as:  VITAMIN B12 Take 500 mcg by mouth daily. dilTIAZem 360 mg ER capsule Commonly known as:  [facility] Take 1 Cap by mouth daily. latanoprost 0.005 % ophthalmic solution Commonly known as:  Elverna Alise Administer 1 Drop to both eyes nightly. LOPRESSOR 50 mg tablet Generic drug:  metoprolol tartrate Take  by mouth two (2) times a day. losartan 50 mg tablet Commonly known as:  COZAAR Take  by mouth daily. metFORMIN 1,000 mg tablet Commonly known as:  GLUCOPHAGE Take 1,000 mg by mouth daily. omeprazole 40 mg capsule Commonly known as:  PRILOSEC Take 40 mg by mouth daily. raNITIdine hcl 150 mg capsule Take 150 mg by mouth two (2) times a day. traZODone 50 mg tablet Commonly known as:  Sebastián McGraw Take  by mouth nightly. triamcinolone acetonide 0.1 % ointment Commonly known as:  KENALOG Apply  to affected area two (2) times a day. use thin layer  
  
 venlafaxine- mg capsule Commonly known as:  EFFEXOR-XR Take 1 Cap by mouth daily. warfarin 7.5 mg tablet Commonly known as:  COUMADIN  
TAKE ONE TABLET BY MOUTH ONCE DAILY Prescriptions Sent to Pharmacy Refills  
 venlafaxine-SR (EFFEXOR-XR) 150 mg capsule 0 Sig: Take 1 Cap by mouth daily. Class: Normal  
 Pharmacy: [address]  [address] [place] [address] Jasnataliya  Brooklyn Bullard  #: [phone] Route: Oral  
  
We Performed the Following AMB POC HEMOGLOBIN A1C [06339 CPT(R)] AMB POC PT/INR [41651 CPT(R)] Follow-up Instructions Return in about 3 months (around 6/5/2018). To-Do List   
 03/05/2018 Microbiology:  C. DIFFICILE/EPI PCR Introducing [facility]! [facility] introduces [employer] patient portal. Now you can access parts of your medical record, email your doctor's office, and request medication refills online. 1. In your internet browser, go to [email or web address] 2. Click on the First Time User? Click Here link in the Sign In box. You will see the New Member Sign Up page. 3. Enter your [employer] Access Code exactly as it appears below. You will not need to use this code after youve completed the sign-up process. If you do not sign up before the expiration date, you must request a new code. · [employer] Access Code: [email or web address] Expires: 6/3/2018 11:30 AM 
 
4. Enter the last four digits of your Social Security Number (xxxx) and Date of Birth (mm/dd/yyyy) as indicated and click Submit. You will be taken to the next sign-up page. 5. Create a [employer] ID. This will be your [employer] login ID and cannot be changed, so think of one that is secure and easy to remember. 6. Create a [employer] password. You can change your password at any time. 7. Enter your Password Reset Question and Answer. This can be used at a later time if you forget your password. 8. Enter your e-mail address. You will receive e-mail notification when new information is available in [address] 9. Click Sign Up. You can now view and download portions of your medical record. 10. Click the Download Summary menu link to download a portable copy of your medical information. If you have questions, please visit the Frequently Asked Questions section of the [employer] website. Remember, [employer] is NOT to be used for urgent needs. For medical emergencies, dial 911. Now available from your iPhone and Android! Please provide this summary of care documentation to your next provider. Your primary care clinician is listed as Jorge 13. If you have any questions after today's visit, please call [phone].

## 2018-03-06 DIAGNOSIS — I48.20 CHRONIC ATRIAL FIBRILLATION (HCC): ICD-10-CM

## 2018-03-06 RX ORDER — SUCRALFATE 1 G/1
1 TABLET ORAL 4 TIMES DAILY
Qty: 120 TAB | Refills: 0 | Status: SHIPPED | OUTPATIENT
Start: 2018-03-06 | End: 2018-07-12 | Stop reason: ALTCHOICE

## 2018-03-06 RX ORDER — WARFARIN 7.5 MG/1
TABLET ORAL
Qty: 30 TAB | Refills: 0 | Status: SHIPPED | OUTPATIENT
Start: 2018-03-06 | End: 2018-04-04 | Stop reason: SDUPTHER

## 2018-03-08 LAB — C DIFF TOX A+B STL QL IA: NEGATIVE

## 2018-04-04 DIAGNOSIS — I48.20 CHRONIC ATRIAL FIBRILLATION (HCC): ICD-10-CM

## 2018-04-04 RX ORDER — WARFARIN 7.5 MG/1
TABLET ORAL
Qty: 30 TAB | Refills: 0 | Status: SHIPPED | OUTPATIENT
Start: 2018-04-04 | End: 2018-05-01 | Stop reason: SDUPTHER

## 2018-04-05 ENCOUNTER — CLINICAL SUPPORT (OUTPATIENT)
Dept: FAMILY MEDICINE CLINIC | Age: 78
End: 2018-04-05

## 2018-04-05 VITALS
SYSTOLIC BLOOD PRESSURE: 140 MMHG | BODY MASS INDEX: 40.85 KG/M2 | OXYGEN SATURATION: 96 % | RESPIRATION RATE: 20 BRPM | DIASTOLIC BLOOD PRESSURE: 80 MMHG | HEIGHT: 62 IN | HEART RATE: 86 BPM | WEIGHT: 222 LBS | TEMPERATURE: 97.7 F

## 2018-04-05 DIAGNOSIS — I48.20 CHRONIC ATRIAL FIBRILLATION (HCC): Primary | ICD-10-CM

## 2018-04-05 LAB
INR BLD: 2.3
PT POC: 27.1 SECONDS
VALID INTERNAL CONTROL?: YES

## 2018-04-05 NOTE — PROGRESS NOTES
New Carter is a 76 y.o. female who presents today for Anticoagulation monitoring. Indication: Atrial Fibrillation  INR Goal: 2.0-3.0. Current dose:  Coumadin 7.5 mg daily. Missed Coumadin Doses:  None  Medication Changes:  no  Dietary Changes:  no    Symptoms: taking coumadin appropriately without any bleeding. Latest INRs:  Lab Results   Component Value Date/Time    INR POC 2.3 04/05/2018 11:02 AM    INR POC 2.3 03/05/2018 11:16 AM    INR POC 2.5 02/05/2018 11:02 AM        New Coumadin dose:.current treatment plan is effective, no change in therapy. Next check to be scheduled for  1 month.

## 2018-05-01 DIAGNOSIS — I48.20 CHRONIC ATRIAL FIBRILLATION (HCC): ICD-10-CM

## 2018-05-01 RX ORDER — WARFARIN 7.5 MG/1
TABLET ORAL
Qty: 30 TAB | Refills: 0 | Status: SHIPPED | OUTPATIENT
Start: 2018-05-01 | End: 2018-05-29 | Stop reason: SDUPTHER

## 2018-05-07 ENCOUNTER — OFFICE VISIT (OUTPATIENT)
Dept: FAMILY MEDICINE CLINIC | Age: 78
End: 2018-05-07

## 2018-05-07 VITALS
OXYGEN SATURATION: 96 % | RESPIRATION RATE: 20 BRPM | HEIGHT: 62 IN | BODY MASS INDEX: 40.48 KG/M2 | DIASTOLIC BLOOD PRESSURE: 80 MMHG | HEART RATE: 95 BPM | TEMPERATURE: 98.5 F | SYSTOLIC BLOOD PRESSURE: 132 MMHG | WEIGHT: 220 LBS

## 2018-05-07 DIAGNOSIS — I10 ESSENTIAL HYPERTENSION: ICD-10-CM

## 2018-05-07 DIAGNOSIS — I48.20 CHRONIC ATRIAL FIBRILLATION (HCC): Primary | ICD-10-CM

## 2018-05-07 DIAGNOSIS — E11.9 TYPE 2 DIABETES MELLITUS WITHOUT COMPLICATION, WITHOUT LONG-TERM CURRENT USE OF INSULIN (HCC): ICD-10-CM

## 2018-05-07 DIAGNOSIS — R10.84 GENERALIZED ABDOMINAL PAIN: ICD-10-CM

## 2018-05-07 DIAGNOSIS — F41.8 DEPRESSION WITH ANXIETY: ICD-10-CM

## 2018-05-07 DIAGNOSIS — K59.00 CONSTIPATION, UNSPECIFIED CONSTIPATION TYPE: ICD-10-CM

## 2018-05-07 LAB
INR BLD: 2
PT POC: 24.3 SECONDS
VALID INTERNAL CONTROL?: YES

## 2018-05-07 RX ORDER — BUPROPION HYDROCHLORIDE 150 MG/1
150 TABLET ORAL
Qty: 30 TAB | Refills: 0 | Status: SHIPPED | OUTPATIENT
Start: 2018-05-07 | End: 2018-05-21 | Stop reason: ALTCHOICE

## 2018-05-07 RX ORDER — POLYETHYLENE GLYCOL 3350 17 G/17G
17 POWDER, FOR SOLUTION ORAL DAILY
Qty: 765 G | Refills: 1 | Status: SHIPPED | OUTPATIENT
Start: 2018-05-07 | End: 2018-09-24 | Stop reason: SDUPTHER

## 2018-05-07 RX ORDER — DICYCLOMINE HYDROCHLORIDE 10 MG/1
10 CAPSULE ORAL
Qty: 90 CAP | Refills: 0 | Status: SHIPPED | OUTPATIENT
Start: 2018-05-07

## 2018-05-07 NOTE — PATIENT INSTRUCTIONS
Bupropion (By mouth)   Bupropion ([id number])  Treats depression and aids in quitting smoking. Also prevents depression caused by seasonal affective disorder (SAD). Brand Name(s): Aplenzin, Forfivo XL, Wellbutrin, Wellbutrin SR, Wellbutrin XL, Zyban   There may be other brand names for this medicine. When This Medicine Should Not Be Used: This medicine is not right for everyone. Do not use it if you had an allergic reaction to bupropion, or if you have seizures, anorexia, or bulimia. How to Use This Medicine:   Tablet, Long Acting Tablet  · Take your medicine as directed. Your dose may need to be changed several times to find what works best for you. · You may need to take Wellbutrin® for up to 4 weeks before you feel better. You may need to take Zyban® for 1 to 2 weeks before the date that you plan to stop smoking. · Swallow the extended-release tablet whole. Do not crush, break, or chew it. · It is best to take Aplenzin® in the morning. · Do not take Wellbutrin® or Zyban® close to bedtime if you have trouble sleeping. · Take it with food if it upsets your stomach or if you have nausea. · If you take the extended-release tablet, part of the tablet may pass into your stools. This is normal and is nothing to worry about. · This medicine should come with a Medication Guide. Ask your pharmacist for a copy if you do not have one. · Missed dose: Skip the missed dose and go back to your regular dosing schedule. Never take extra medicine to make up for a missed dose. · Store the medicine in a closed container at room temperature, away from heat, moisture, and direct light. Drugs and Foods to Avoid:   Ask your doctor or pharmacist before using any other medicine, including over-the-counter medicines, vitamins, and herbal products. · Do not use this medicine and an MAO inhibitor (MAOI) within 14 days of each other.  Do not use Zyban® to quit smoking if you already take Aplenzin® or Wellbutrin® for depression, because they are the same medicine. · Tell your doctor if you take barbiturates, benzodiazepines, antiseizure medicine, or sedatives, or if you recently stopped taking them. · Some medicines can affect how bupropion works. Tell your doctor if you use any of the following:   ¨ Amantadine, carbamazepine, cimetidine, clopidogrel, cyclophosphamide, digoxin, efavirenz, levodopa, lopinavir, nelfinavir, nicotine patch, orphenadrine, phenobarbital, phenytoin, ritonavir, tamoxifen, theophylline, thiotepa, ticlopidine  ¨ Beta blocker medicine (including metoprolol)  ¨ A blood thinner (including warfarin)  ¨ Insulin or diabetes medicine  ¨ Medicine to treat depression (including desipramine, fluoxetine, imipramine, nortriptyline, paroxetine, sertraline, venlafaxine)  ¨ Medicine to treat heart rhythm problems (including flecainide, propafenone)  ¨ Medicine to treat mental illness (including haloperidol, risperidone, thioridazine)  ¨ Steroid medicine (including dexamethasone, hydrocortisone, methylprednisolone, prednisolone, prednisone)  · Do not drink alcohol while you are using this medicine. · Tell your doctor if you use anything else that makes you sleepy. Some examples are allergy medicine, narcotic pain medicine, and alcohol. Warnings While Using This Medicine:   · Tell your doctor if you are pregnant or breastfeeding, or if you have kidney disease, liver disease, heart disease, diabetes, glaucoma, mental illness (including bipolar disorder), or high blood pressure. Tell your doctor if you have a history of drug addiction or if you drink alcohol. · For some children, teenagers, and young adults, this medicine may increase mental or emotional problems. This may lead to thoughts of suicide and violence. Talk with your doctor right away if you have any thoughts or behavior changes that concern you. Tell your doctor if you or anyone in your family has a history of bipolar disorder or suicide attempts.   · This medicine may cause the following problems:  ¨ Increased risk of seizures  ¨ Changes in mood or behavior  ¨ High blood pressure  ¨ Serious skin reactions  · This medicine may make you dizzy or drowsy. Do not drive or do anything that could be dangerous until you know how this medicine affects you. · Zyban® is only part of a complete program to help you quit smoking. You may still want to smoke at times. Have a plan to cope with these situations. · Do not stop using this medicine suddenly. Your doctor will need to slowly decrease your dose before you stop it completely. · Tell any doctor or dentist who treats you that you are using this medicine. This medicine may affect certain medical test results. · Your doctor will check your progress and the effects of this medicine at regular visits. Keep all appointments. · Keep all medicine out of the reach of children. Never share your medicine with anyone. Possible Side Effects While Using This Medicine:   Call your doctor right away if you notice any of these side effects:  · Allergic reaction: Itching or hives, swelling in your face or hands, swelling or tingling in your mouth or throat, chest tightness, trouble breathing  · Blistering, peeling, red skin rash  · Chest pain, trouble breathing, fast, slow, or uneven heartbeat  · Eye pain, vision changes, seeing halos around lights  · Muscle or joint pain, fever with rash  · Seeing or hearing things that are not there, feeling like people are against you  · Seizures  · Sudden increase in energy, racing thoughts, trouble sleeping  · Thoughts of hurting yourself, worsening depression, severe agitation or confusion  If you notice these less serious side effects, talk with your doctor:   · Dry mouth  · Headache, dizziness  · Nausea, vomiting, constipation, diarrhea, gas, stomach pain  · Weight gain or loss  If you notice other side effects that you think are caused by this medicine, tell your doctor.    Call your doctor for medical advice about side effects. You may report side effects to FDA at 2-217-FDA-5524  © 2017 [employer]  Information is for End User's use only and may not be sold, redistributed or otherwise used for commercial purposes. The above information is an  only. It is not intended as medical advice for individual conditions or treatments. Talk to your doctor, nurse or pharmacist before following any medical regimen to see if it is safe and effective for you.

## 2018-05-07 NOTE — PROGRESS NOTES
Assessment/Plan:    1. Chronic atrial fibrillation (HCC)  -cont current regimen. - AMB POC PT/INR    2. Depression with anxiety  -wean effexor (take 1 qoday x 1 week, then stop). Start wellbutrin. F/u in 2mos  - buPROPion XL (WELLBUTRIN XL) 150 mg tablet; Take 1 Tab by mouth every morning. Dispense: 30 Tab; Refill: 0    3. Essential hypertension  -bp controlled    4. Generalized abdominal pain- will stop metformin to see if this helps. Also treat constipation  - dicyclomine (BENTYL) 10 mg capsule; Take 1 Cap by mouth three (3) times daily as needed. Dispense: 90 Cap; Refill: 0    5. Type 2 diabetes mellitus without complication, without long-term current use of insulin (HCC)  -lorene a1c 2 mos after being off metformin    6. Constipation, unspecified constipation type  - polyethylene glycol (MIRALAX) 17 gram/dose powder; Take 17 g by mouth daily. Dispense: 765 g; Refill: 1      The plan was discussed with the patient. The patient verbalized understanding and is in agreement with the plan. All medication potential side effects were discussed with the patient. Health Maintenance:   Health Maintenance   Topic Date Due    MICROALBUMIN Q1  07/21/2018    LIPID PANEL Q1  07/21/2018    Influenza Age 5 to Adult  08/01/2018    FOOT EXAM Q1  08/02/2018    EYE EXAM RETINAL OR DILATED Q1  08/03/2018    HEMOGLOBIN A1C Q6M  09/05/2018    Pneumococcal 65+ Low/Medium Risk (2 of 2 - PPSV23) 12/04/2018    MEDICARE YEARLY EXAM  12/05/2018    GLAUCOMA SCREENING Q2Y  08/03/2019    DTaP/Tdap/Td series (2 - Td) 09/06/2027    Bone Densitometry (Dexa) Screening  Completed    ZOSTER VACCINE AGE 60>  Addressed       Leslie Woods is a [de-identified] y.o. female and presents with Anticoagulation; Diabetes; and Hypertension     Subjective:  Depression and anxiety - effexor incr at last visit. She hasn't noticed any improvement. She states she has anxiety, lack of motivation. States she doesn't want to get up out of bed.   States she misses her home, but can't go back b/c she doesn't have the money to. She states she's \"doom and gloom\". Has financial stressors. She c/o \"stomach issues\". States she had improved sx with decreased metformin dosing. She has abd pain prior to bm, but has pain in setting of no bm. States she's gone through this before and was dx with \"chronic gastritis\". Bentyl as helped in the past.   No diarrhea. Does have some constipation - hard to pass, round stools. Afib - on coumadin. INR is 2.0. She is rate controlled. HTN- bp controlled. ROS:  Constitutional: No recent weight change. No weakness/fatigue. No f/c. Cardiovascular: No CP/palpitations. No CHO/orthopnea/PND. Respiratory: No cough/sputum, dyspnea, wheezing. Gastointestinal: No dysphagia, reflux. No n/v.  +constipation/no diarrhea. No melena/rectal bleeding. Neurological: No seizures/numbness/weakness. No paresthesias. Psychiatric:  + depression, +anxiety. The problem list was updated as a part of today's visit. Patient Active Problem List   Diagnosis Code    Osteopenia of multiple sites M85.89    ANA CRISTINA (obstructive sleep apnea) G47.33    A-fib ([facility]) I48.91    Right bundle branch block (RBBB) I45.10    Type 2 diabetes mellitus without complication, without long-term current use of insulin ([facility]) E11.9    Anticoagulation monitoring, INR range 2-3 Z79.01    Wheezing R06.2    Depression with anxiety F41.8    Severe obesity (BMI >= 40) ([facility]) E66.01    Essential hypertension I10    DDD (degenerative disc disease), thoracolumbar M51.35       The PSH, FH were reviewed.     SH:  Social History   Substance Use Topics    Smoking status: Never Smoker    Smokeless tobacco: Never Used    Alcohol use No       Medications/Allergies:  Current Outpatient Prescriptions on File Prior to Visit   Medication Sig Dispense Refill    warfarin (COUMADIN) 7.5 mg tablet TAKE 1 TABLET BY MOUTH ONCE DAILY 30 Tab 0    sucralfate (CARAFATE) 1 gram tablet Take 1 Tab by mouth four (4) times daily. 120 Tab 0    venlafaxine-SR (EFFEXOR-XR) 150 mg capsule Take 1 Cap by mouth daily. 90 Cap 0    dilTIAZem (TIAZAC) 360 mg ER capsule Take 1 Cap by mouth daily.  cholecalciferol (VITAMIN D3) 1,000 unit tablet Take 1 Tab by mouth daily.  metoprolol tartrate (LOPRESSOR) 50 mg tablet Take  by mouth two (2) times a day.  acetaminophen (TYLENOL) 500 mg tablet Take 500 mg by mouth every four (4) hours as needed.  brinzolamide-brimonidine 1-0.2 % drps Administer 1 Drop to both eyes two (2) times a day.  latanoprost (XALATAN) 0.005 % ophthalmic solution Administer 1 Drop to both eyes nightly.  metFORMIN (GLUCOPHAGE) 1,000 mg tablet Take 500 mg by mouth daily.  losartan (COZAAR) 50 mg tablet Take  by mouth daily.  omeprazole (PRILOSEC) 40 mg capsule Take 40 mg by mouth daily.  raNITIdine hcl 150 mg capsule Take 150 mg by mouth two (2) times a day.  traZODone (DESYREL) 50 mg tablet Take  by mouth nightly.  cyanocobalamin (VITAMIN B12) 500 mcg tablet Take 500 mcg by mouth daily.  triamcinolone acetonide (KENALOG) 0.1 % ointment Apply  to affected area two (2) times a day. use thin layer 30 g 0     No current facility-administered medications on file prior to visit. Allergies   Allergen Reactions    Sulfa (Sulfonamide Antibiotics) Photosensitivity       Objective:  Visit Vitals    /80 (BP 1 Location: Left arm, BP Patient Position: Sitting)    Pulse 95    Temp 98.5 °F (36.9 °C) (Oral)    Resp 20    Ht 5' 2\" (1.575 m)    Wt 220 lb (99.8 kg)    SpO2 96%    BMI 40.24 kg/m2      Constitutional: Well developed, nourished, no distress, alert, obese habitus   CV: S1, S2.  RRR. No murmurs/rubs. No thrills palpated. No carotid bruits. Intact distal pulses. No edema. Pulm: No abnormalities on inspection. Clear to auscultation bilaterally. No wheezing/rhonchi. Normal effort.      GI: Soft, diffusely midly tender, no rebound or guarding, nondistended. Normal active bowel sounds. Psych: Appropriate affect, judgement and insight. Short-term memory intact.

## 2018-05-07 NOTE — MR AVS SNAPSHOT
[address] [place] [address]  [address] [facility] [id number] [phone] Patient: Winston Lind MRN: [id number] [facility]:[id number] Visit Information Date & Time Provider Department Dept. Phone Encounter #  
 5/7/2018 10:45 AM Allison Luna, 3 [facility] [id number] Upcoming Health Maintenance Date Due MICROALBUMIN Q1 7/21/2018 LIPID PANEL Q1 7/21/2018 Influenza Age 5 to Adult 8/1/2018 FOOT EXAM Q1 8/2/2018 EYE EXAM RETINAL OR DILATED Q1 8/3/2018 HEMOGLOBIN A1C Q6M 9/5/2018 Pneumococcal 65+ Low/Medium Risk (2 of 2 - PPSV23) 12/4/2018 MEDICARE YEARLY EXAM 12/5/2018 GLAUCOMA SCREENING Q2Y 8/3/2019 DTaP/Tdap/Td series (2 - Td) 9/6/2027 Allergies as of 5/7/2018  Review Complete On: 5/7/2018 By: Allison Luna MD  
  
 Severity Noted Reaction Type Reactions Sulfa (Sulfonamide Antibiotics)  07/12/2017    Photosensitivity Current Immunizations  Never Reviewed Name Date Influenza High Dose Vaccine PF 10/6/2017  1:17 PM  
  
 Not reviewed this visit You Were Diagnosed With   
  
 Codes Comments Chronic atrial fibrillation (HCC)    -  Primary ICD-10-CM: W35.7 ICD-9-CM: 427.31 Depression with anxiety     ICD-10-CM: F41.8 ICD-9-CM: 300.4 Essential hypertension     ICD-10-CM: I10 
ICD-9-CM: 401.9 Generalized abdominal pain     ICD-10-CM: R10.84 ICD-9-CM: 789.07 Type 2 diabetes mellitus without complication, without long-term current use of insulin (HCC)     ICD-10-CM: E11.9 ICD-9-CM: 250.00 Constipation, unspecified constipation type     ICD-10-CM: K59.00 ICD-9-CM: 564.00 Vitals BP Pulse Temp Resp Height(growth percentile) Weight(growth percentile) 132/80 (BP 1 Location: Left arm, BP Patient Position: Sitting) 95 98.5 °F (36.9 °C) (Oral) 20 5' 2\" (1.575 m) 220 lb (99.8 kg) SpO2 BMI OB Status Smoking Status 96% 40.24 kg/m2 Hysterectomy Never Smoker Vitals History BMI and BSA Data Body Mass Index Body Surface Area  
 40.24 kg/m 2 2.09 m 2 Preferred Pharmacy Pharmacy Name Phone [address] Hilario Claros [phone] Your Updated Medication List  
  
   
This list is accurate as of 5/7/18 11:07 AM.  Always use your most recent med list.  
  
  
  
  
 acetaminophen 500 mg tablet Commonly known as:  TYLENOL Take 500 mg by mouth every four (4) hours as needed. brinzolamide-brimonidine 1-0.2 % Drps Administer 1 Drop to both eyes two (2) times a day. buPROPion  mg tablet Commonly known as:  George Mon Take 1 Tab by mouth every morning. cholecalciferol 1,000 unit tablet Commonly known as:  VITAMIN D3 Take 1 Tab by mouth daily. cyanocobalamin 500 mcg tablet Commonly known as:  VITAMIN B12 Take 500 mcg by mouth daily. dicyclomine 10 mg capsule Commonly known as:  BENTYL Take 1 Cap by mouth three (3) times daily as needed. dilTIAZem 360 mg ER capsule Commonly known as:  [facility] Take 1 Cap by mouth daily. latanoprost 0.005 % ophthalmic solution Commonly known as:  Linda Campbell Administer 1 Drop to both eyes nightly. LOPRESSOR 50 mg tablet Generic drug:  metoprolol tartrate Take  by mouth two (2) times a day. losartan 50 mg tablet Commonly known as:  COZAAR Take  by mouth daily. omeprazole 40 mg capsule Commonly known as:  PRILOSEC Take 40 mg by mouth daily. polyethylene glycol 17 gram/dose powder Commonly known as:  Jerilyn Cancel Take 17 g by mouth daily. raNITIdine hcl 150 mg capsule Take 150 mg by mouth two (2) times a day. sucralfate 1 gram tablet Commonly known as:  Mariela Bon Take 1 Tab by mouth four (4) times daily. traZODone 50 mg tablet Commonly known as:  Sandhya Katelyn Take  by mouth nightly. triamcinolone acetonide 0.1 % ointment Commonly known as:  KENALOG Apply  to affected area two (2) times a day. use thin layer  
  
 warfarin 7.5 mg tablet Commonly known as:  COUMADIN  
TAKE 1 TABLET BY MOUTH ONCE DAILY Prescriptions Sent to Pharmacy Refills  
 dicyclomine (BENTYL) 10 mg capsule 0 Sig: Take 1 Cap by mouth three (3) times daily as needed. Class: Normal  
 Pharmacy: [facility] DR VETO PARADA [address] [place] [address] Ph #: [phone] Route: Oral  
 buPROPion XL (WELLBUTRIN XL) 150 mg tablet 0 Sig: Take 1 Tab by mouth every morning. Class: Normal  
 Pharmacy: [facility] DR VETO PARADA [address] [place] [address] Ph #: [phone] Route: Oral  
 polyethylene glycol (MIRALAX) 17 gram/dose powder 1 Sig: Take 17 g by mouth daily. Class: Normal  
 Pharmacy: [facility] DR VETO PARADA [address] [place] [address] Ph #: [phone] Route: Oral  
  
We Performed the Following AMB POC PT/INR [67673 CPT(R)] Patient Instructions Bupropion (By mouth) Bupropion ([id number]) Treats depression and aids in quitting smoking. Also prevents depression caused by seasonal affective disorder (SAD). Brand Name(s): Aplenzin, Forfivo XL, Wellbutrin, Wellbutrin SR, Wellbutrin XL, Zyban There may be other brand names for this medicine. When This Medicine Should Not Be Used: This medicine is not right for everyone. Do not use it if you had an allergic reaction to bupropion, or if you have seizures, anorexia, or bulimia. How to Use This Medicine:  
Tablet, Long Acting Tablet · Take your medicine as directed. Your dose may need to be changed several times to find what works best for you. · You may need to take Wellbutrin® for up to 4 weeks before you feel better. You may need to take Zyban® for 1 to 2 weeks before the date that you plan to stop smoking. · Swallow the extended-release tablet whole. Do not crush, break, or chew it. · It is best to take Aplenzin® in the morning. · Do not take Wellbutrin® or Zyban® close to bedtime if you have trouble sleeping. · Take it with food if it upsets your stomach or if you have nausea. · If you take the extended-release tablet, part of the tablet may pass into your stools. This is normal and is nothing to worry about. · This medicine should come with a Medication Guide. Ask your pharmacist for a copy if you do not have one. · Missed dose: Skip the missed dose and go back to your regular dosing schedule. Never take extra medicine to make up for a missed dose. · Store the medicine in a closed container at room temperature, away from heat, moisture, and direct light. Drugs and Foods to Avoid: Ask your doctor or pharmacist before using any other medicine, including over-the-counter medicines, vitamins, and herbal products. · Do not use this medicine and an MAO inhibitor (MAOI) within 14 days of each other. Do not use Zyban® to quit smoking if you already take Aplenzin® or Wellbutrin® for depression, because they are the same medicine. · Tell your doctor if you take barbiturates, benzodiazepines, antiseizure medicine, or sedatives, or if you recently stopped taking them. · Some medicines can affect how bupropion works. Tell your doctor if you use any of the following: ¨ Amantadine, carbamazepine, cimetidine, clopidogrel, cyclophosphamide, digoxin, efavirenz, levodopa, lopinavir, nelfinavir, nicotine patch, orphenadrine, phenobarbital, phenytoin, ritonavir, tamoxifen, theophylline, thiotepa, ticlopidine ¨ Beta blocker medicine (including metoprolol) ¨ A blood thinner (including warfarin) ¨ Insulin or diabetes medicine ¨ Medicine to treat depression (including desipramine, fluoxetine, imipramine, nortriptyline, paroxetine, sertraline, venlafaxine) ¨ Medicine to treat heart rhythm problems (including flecainide, propafenone) ¨ Medicine to treat mental illness (including haloperidol, risperidone, thioridazine) ¨ Steroid medicine (including dexamethasone, hydrocortisone, methylprednisolone, prednisolone, prednisone) · Do not drink alcohol while you are using this medicine. · Tell your doctor if you use anything else that makes you sleepy. Some examples are allergy medicine, narcotic pain medicine, and alcohol. Warnings While Using This Medicine: · Tell your doctor if you are pregnant or breastfeeding, or if you have kidney disease, liver disease, heart disease, diabetes, glaucoma, mental illness (including bipolar disorder), or high blood pressure. Tell your doctor if you have a history of drug addiction or if you drink alcohol. · For some children, teenagers, and young adults, this medicine may increase mental or emotional problems. This may lead to thoughts of suicide and violence. Talk with your doctor right away if you have any thoughts or behavior changes that concern you. Tell your doctor if you or anyone in your family has a history of bipolar disorder or suicide attempts. · This medicine may cause the following problems: 
¨ Increased risk of seizures ¨ Changes in mood or behavior ¨ High blood pressure ¨ Serious skin reactions · This medicine may make you dizzy or drowsy. Do not drive or do anything that could be dangerous until you know how this medicine affects you. · Zyban® is only part of a complete program to help you quit smoking. You may still want to smoke at times. Have a plan to cope with these situations. · Do not stop using this medicine suddenly. Your doctor will need to slowly decrease your dose before you stop it completely. · Tell any doctor or dentist who treats you that you are using this medicine. This medicine may affect certain medical test results. · Your doctor will check your progress and the effects of this medicine at regular visits. Keep all appointments. · Keep all medicine out of the reach of children. Never share your medicine with anyone. Possible Side Effects While Using This Medicine:  
Call your doctor right away if you notice any of these side effects: · Allergic reaction: Itching or hives, swelling in your face or hands, swelling or tingling in your mouth or throat, chest tightness, trouble breathing · Blistering, peeling, red skin rash · Chest pain, trouble breathing, fast, slow, or uneven heartbeat · Eye pain, vision changes, seeing halos around lights · Muscle or joint pain, fever with rash · Seeing or hearing things that are not there, feeling like people are against you · Seizures · Sudden increase in energy, racing thoughts, trouble sleeping · Thoughts of hurting yourself, worsening depression, severe agitation or confusion If you notice these less serious side effects, talk with your doctor: · Dry mouth 
· Headache, dizziness · Nausea, vomiting, constipation, diarrhea, gas, stomach pain · Weight gain or loss If you notice other side effects that you think are caused by this medicine, tell your doctor. Call your doctor for medical advice about side effects. You may report side effects to FDA at 1-662-FDA-2350 © 2017 [address] Information is for End User's use only and may not be sold, redistributed or otherwise used for commercial purposes. The above information is an  only. It is not intended as medical advice for individual conditions or treatments. Talk to your doctor, nurse or pharmacist before following any medical regimen to see if it is safe and effective for you. Introducing [facility]! Catie Still introduces [employer] patient portal. Now you can access parts of your medical record, email your doctor's office, and request medication refills online. 1. In your internet browser, go to [email or web address] 2. Click on the First Time User? Click Here link in the Sign In box. You will see the New Member Sign Up page. 3. Enter your AT Internet Access Code exactly as it appears below. You will not need to use this code after youve completed the sign-up process. If you do not sign up before the expiration date, you must request a new code. · [employer] Internet Access Code: [email or web address] Expires: 6/3/2018 12:30 PM 
 
4. Enter the last four digits of your Social Security Number (xxxx) and Date of Birth (mm/dd/yyyy) as indicated and click Submit. You will be taken to the next sign-up page. 5. Create a AT Internet ID. This will be your AT Internet login ID and cannot be changed, so think of one that is secure and easy to remember. 6. Create a AT Internet password. You can change your password at any time. 7. Enter your Password Reset Question and Answer. This can be used at a later time if you forget your password. 8. Enter your e-mail address. You will receive e-mail notification when new information is available in [address] 9. Click Sign Up. You can now view and download portions of your medical record. 10. Click the Download Summary menu link to download a portable copy of your medical information. If you have questions, please visit the Frequently Asked Questions section of the AT Internet website. Remember, AT Internet is NOT to be used for urgent needs. For medical emergencies, dial 911. Now available from your iPhone and Android! Please provide this summary of care documentation to your next provider. Your primary care clinician is listed as Jorge 13. If you have any questions after today's visit, please call [phone].

## 2018-05-21 ENCOUNTER — TELEPHONE (OUTPATIENT)
Dept: FAMILY MEDICINE CLINIC | Age: 78
End: 2018-05-21

## 2018-05-21 RX ORDER — SERTRALINE HYDROCHLORIDE 50 MG/1
50 TABLET, FILM COATED ORAL DAILY
Qty: 30 TAB | Refills: 1 | Status: SHIPPED | OUTPATIENT
Start: 2018-05-21 | End: 2018-07-12 | Stop reason: SDUPTHER

## 2018-05-21 NOTE — TELEPHONE ENCOUNTER
Patient is calling would like to speak to the nurse. Patient is stating that the provider prescribed her a Wellbutrin and it is making her itch. Patient declined another appointment with another provider and wants to be seen by her provider only.      Please assist

## 2018-05-24 RX ORDER — LOSARTAN POTASSIUM 50 MG/1
50 TABLET ORAL 2 TIMES DAILY
Qty: 180 TAB | Refills: 0 | Status: SHIPPED | OUTPATIENT
Start: 2018-05-24 | End: 2018-09-24 | Stop reason: SDUPTHER

## 2018-05-24 NOTE — TELEPHONE ENCOUNTER
Patient pharmacy is requesting a med refill of Losartan 50 mg 1 tab BID    Last visit: 5/7/18    Last refill: unknown /historical provider (take 1 tab daily)

## 2018-05-29 DIAGNOSIS — I48.20 CHRONIC ATRIAL FIBRILLATION (HCC): ICD-10-CM

## 2018-05-29 RX ORDER — WARFARIN 7.5 MG/1
TABLET ORAL
Qty: 30 TAB | Refills: 0 | Status: SHIPPED | OUTPATIENT
Start: 2018-05-29 | End: 2018-06-27 | Stop reason: SDUPTHER

## 2018-06-07 ENCOUNTER — CLINICAL SUPPORT (OUTPATIENT)
Dept: FAMILY MEDICINE CLINIC | Age: 78
End: 2018-06-07

## 2018-06-07 VITALS
OXYGEN SATURATION: 99 % | WEIGHT: 223 LBS | BODY MASS INDEX: 41.04 KG/M2 | SYSTOLIC BLOOD PRESSURE: 142 MMHG | HEIGHT: 62 IN | RESPIRATION RATE: 17 BRPM | TEMPERATURE: 98.8 F | HEART RATE: 78 BPM | DIASTOLIC BLOOD PRESSURE: 70 MMHG

## 2018-06-07 DIAGNOSIS — I48.20 CHRONIC ATRIAL FIBRILLATION (HCC): Primary | ICD-10-CM

## 2018-06-07 LAB
INR BLD: 2
PT POC: 24.4 SECONDS
VALID INTERNAL CONTROL?: YES

## 2018-06-07 NOTE — PROGRESS NOTES
Arianna Lau is a [de-identified] y.o. female who presents today for Anticoagulation monitoring. Indication: Atrial Fibrillation  INR Goal: 2.0-3.0. Current dose:  Coumadin 7.5 mg daily. Missed Coumadin Doses:  None  Medication Changes:  no  Dietary Changes:  no    Symptoms: taking coumadin appropriately without any bleeding. Latest INRs:  Lab Results   Component Value Date/Time    INR POC 2.0 06/07/2018 11:15 AM    INR POC 2.0 05/07/2018 10:50 AM    INR POC 2.3 04/05/2018 11:02 AM        New Coumadin dose:.current treatment plan is effective, no change in therapy. Next check to be scheduled for  1 month.

## 2018-06-27 DIAGNOSIS — I48.20 CHRONIC ATRIAL FIBRILLATION (HCC): ICD-10-CM

## 2018-06-27 RX ORDER — WARFARIN 7.5 MG/1
TABLET ORAL
Qty: 30 TAB | Refills: 0 | Status: SHIPPED | OUTPATIENT
Start: 2018-06-27 | End: 2018-07-12 | Stop reason: SDUPTHER

## 2018-07-02 RX ORDER — OMEPRAZOLE 40 MG/1
40 CAPSULE, DELAYED RELEASE ORAL DAILY
Qty: 30 CAP | Refills: 0 | Status: SHIPPED | OUTPATIENT
Start: 2018-07-02 | End: 2018-07-12 | Stop reason: SDUPTHER

## 2018-07-12 ENCOUNTER — HOSPITAL ENCOUNTER (OUTPATIENT)
Dept: LAB | Age: 78
Discharge: HOME | End: 2018-07-12

## 2018-07-12 ENCOUNTER — CLINICAL SUPPORT (OUTPATIENT)
Dept: FAMILY MEDICINE CLINIC | Age: 78
End: 2018-07-12

## 2018-07-12 VITALS
HEART RATE: 84 BPM | OXYGEN SATURATION: 94 % | WEIGHT: 222 LBS | TEMPERATURE: 98.6 F | BODY MASS INDEX: 40.85 KG/M2 | DIASTOLIC BLOOD PRESSURE: 68 MMHG | RESPIRATION RATE: 20 BRPM | SYSTOLIC BLOOD PRESSURE: 130 MMHG | HEIGHT: 62 IN

## 2018-07-12 DIAGNOSIS — E11.9 TYPE 2 DIABETES MELLITUS WITHOUT COMPLICATION, WITHOUT LONG-TERM CURRENT USE OF INSULIN (HCC): Primary | ICD-10-CM

## 2018-07-12 DIAGNOSIS — F41.8 DEPRESSION WITH ANXIETY: ICD-10-CM

## 2018-07-12 DIAGNOSIS — Z79.01 ANTICOAGULATION MONITORING, INR RANGE 2-3: ICD-10-CM

## 2018-07-12 DIAGNOSIS — I10 ESSENTIAL HYPERTENSION: ICD-10-CM

## 2018-07-12 DIAGNOSIS — I48.20 CHRONIC ATRIAL FIBRILLATION (HCC): ICD-10-CM

## 2018-07-12 LAB
HBA1C MFR BLD HPLC: 6.8 %
INR BLD: 2.1
PT POC: 24.9 SECONDS
VALID INTERNAL CONTROL?: YES

## 2018-07-12 PROCEDURE — 99001 SPECIMEN HANDLING PT-LAB: CPT | Performed by: INTERNAL MEDICINE

## 2018-07-12 RX ORDER — TRAZODONE HYDROCHLORIDE 50 MG/1
50 TABLET ORAL
Qty: 90 TAB | Refills: 1 | Status: SHIPPED | OUTPATIENT
Start: 2018-07-12 | End: 2018-10-04 | Stop reason: SDUPTHER

## 2018-07-12 RX ORDER — DILTIAZEM HYDROCHLORIDE 360 MG/1
360 CAPSULE, EXTENDED RELEASE ORAL DAILY
Qty: 90 CAP | Refills: 1 | Status: SHIPPED | OUTPATIENT
Start: 2018-07-12 | End: 2018-10-04 | Stop reason: SDUPTHER

## 2018-07-12 RX ORDER — OMEPRAZOLE 20 MG/1
20 CAPSULE, DELAYED RELEASE ORAL DAILY
Qty: 30 CAP | Refills: 0 | Status: SHIPPED | OUTPATIENT
Start: 2018-07-12 | End: 2018-08-08 | Stop reason: SDUPTHER

## 2018-07-12 RX ORDER — SERTRALINE HYDROCHLORIDE 50 MG/1
50 TABLET, FILM COATED ORAL DAILY
Qty: 90 TAB | Refills: 1 | Status: SHIPPED | OUTPATIENT
Start: 2018-07-12 | End: 2018-10-04 | Stop reason: SDUPTHER

## 2018-07-12 RX ORDER — WARFARIN 7.5 MG/1
TABLET ORAL
Qty: 90 TAB | Refills: 0 | Status: SHIPPED | OUTPATIENT
Start: 2018-07-12 | End: 2018-10-04 | Stop reason: SDUPTHER

## 2018-07-12 NOTE — PROGRESS NOTES
Assessment/Plan:    1. Type 2 diabetes mellitus without complication, without long-term current use of insulin (HCC)  -pt to schedule eye exam.  A1c good off metformin. Get labs. - AMB POC HEMOGLOBIN A1C  -  DIABETES FOOT EXAM  - LIPID PANEL; Future  - MICROALBUMIN, UR, RAND W/ MICROALB/CREAT RATIO; Future    2. Chronic atrial fibrillation ([address] 4. Anticoagulation monitoring, INR range 2-3  -cont current regimen. INR in 1 mo. - AMB POC PT/INR  - warfarin (COUMADIN) 7.5 mg tablet; TAKE 1 TABLET BY MOUTH ONCE DAILY  Dispense: 90 Tab; Refill: 0    3. Depression with anxiety  -refilled  - sertraline (ZOLOFT) 50 mg tablet; Take 1 Tab by mouth daily. Dispense: 90 Tab; Refill: 1    4. Essential hypertension  -cont current regimen.  - LIPID PANEL; Future  - CBC W/O DIFF; Future  - METABOLIC PANEL, COMPREHENSIVE; Future      The plan was discussed with the patient. The patient verbalized understanding and is in agreement with the plan. All medication potential side effects were discussed with the patient. Health Maintenance:   Health Maintenance   Topic Date Due    MICROALBUMIN Q1  07/21/2018    LIPID PANEL Q1  07/21/2018    EYE EXAM RETINAL OR DILATED Q1  08/03/2018    Influenza Age 9 to Adult  08/01/2018    HEMOGLOBIN A1C Q6M  09/05/2018    Pneumococcal 65+ Low/Medium Risk (2 of 2 - PPSV23) 12/04/2018    MEDICARE YEARLY EXAM  12/05/2018    FOOT EXAM Q1  07/12/2019    GLAUCOMA SCREENING Q2Y  08/03/2019    DTaP/Tdap/Td series (2 - Td) 09/06/2027    Bone Densitometry (Dexa) Screening  Completed    ZOSTER VACCINE AGE 60>  Addressed       Haley Krause is a [de-identified] y.o. female and presents with Anticoagulation     Subjective:  Depression and anxiety - changed from effexor to wellbutrin. But had itching with wellbutrin. Changed to zoloft. Feels she's doing well. DM2- stopped metformin b/c she was having diarrhea (which has resolved off the med). Her sugars fasting are running 140-160. A1c 6.8.     INR today 2.1. On coumadin 7.5mg.    ROS:  Constitutional: No recent weight change. No weakness/fatigue. No f/c. Cardiovascular: No CP/palpitations. No CHO/orthopnea/PND. Respiratory: No cough/sputum, dyspnea, wheezing. Gastointestinal: No dysphagia, reflux. No n/v. No constipation/diarrhea. No melena/rectal bleeding. Neurological: No seizures/numbness/weakness. No paresthesias. Psychiatric:  No depression, anxiety. The problem list was updated as a part of today's visit. Patient Active Problem List   Diagnosis Code    Osteopenia of multiple sites M85.89    ANA CRISTINA (obstructive sleep apnea) G47.33    A-fib ([facility]) I48.91    Right bundle branch block (RBBB) I45.10    Type 2 diabetes mellitus without complication, without long-term current use of insulin ([facility]) E11.9    Anticoagulation monitoring, INR range 2-3 Z79.01    Wheezing R06.2    Depression with anxiety F41.8    Severe obesity (BMI >= 40) ([facility]) E66.01    Essential hypertension I10    DDD (degenerative disc disease), thoracolumbar M51.35       The PSH, FH were reviewed. SH:  Social History   Substance Use Topics    Smoking status: Never Smoker    Smokeless tobacco: Never Used    Alcohol use No       Medications/Allergies:  Current Outpatient Prescriptions on File Prior to Visit   Medication Sig Dispense Refill    omeprazole (PRILOSEC) 40 mg capsule Take 1 Cap by mouth daily. 30 Cap 0    warfarin (COUMADIN) 7.5 mg tablet TAKE 1 TABLET BY MOUTH ONCE DAILY 30 Tab 0    losartan (COZAAR) 50 mg tablet Take 1 Tab by mouth two (2) times a day. 180 Tab 0    sertraline (ZOLOFT) 50 mg tablet Take 1 Tab by mouth daily. 30 Tab 1    dicyclomine (BENTYL) 10 mg capsule Take 1 Cap by mouth three (3) times daily as needed. 90 Cap 0    polyethylene glycol (MIRALAX) 17 gram/dose powder Take 17 g by mouth daily. 765 g 1    sucralfate (CARAFATE) 1 gram tablet Take 1 Tab by mouth four (4) times daily.  120 Tab 0    dilTIAZem (TIAZAC) 360 mg ER capsule Take 1 Cap by mouth daily.  cholecalciferol (VITAMIN D3) 1,000 unit tablet Take 1 Tab by mouth daily.  metoprolol tartrate (LOPRESSOR) 50 mg tablet Take  by mouth two (2) times a day.  acetaminophen (TYLENOL) 500 mg tablet Take 500 mg by mouth every four (4) hours as needed.  brinzolamide-brimonidine 1-0.2 % drps Administer 1 Drop to both eyes two (2) times a day.  latanoprost (XALATAN) 0.005 % ophthalmic solution Administer 1 Drop to both eyes nightly.  raNITIdine hcl 150 mg capsule Take 150 mg by mouth two (2) times a day.  traZODone (DESYREL) 50 mg tablet Take  by mouth nightly.  cyanocobalamin (VITAMIN B12) 500 mcg tablet Take 500 mcg by mouth daily.  triamcinolone acetonide (KENALOG) 0.1 % ointment Apply  to affected area two (2) times a day. use thin layer 30 g 0     No current facility-administered medications on file prior to visit. Allergies   Allergen Reactions    Sulfa (Sulfonamide Antibiotics) Photosensitivity       Objective:  Visit Vitals    /70 (BP 1 Location: Left arm, BP Patient Position: Sitting)    Pulse 84    Temp 98.6 °F (37 °C) (Oral)    Resp 20    Ht 5' 2\" (1.575 m)    Wt 222 lb (100.7 kg)    SpO2 94%    BMI 40.6 kg/m2      Constitutional: Well developed, nourished, no distress, alert, obese habitus   CV: S1, S2.  RRR. No murmurs/rubs. No thrills palpated. No carotid bruits. Intact distal pulses. No edema. Pulm: No abnormalities on inspection. Clear to auscultation bilaterally. No wheezing/rhonchi. Normal effort. Psych: Appropriate affect, judgement and insight. Short-term memory intact. Monofilament nml bilat.

## 2018-07-12 NOTE — PROGRESS NOTES
Navin Pierson is a [de-identified] y.o. female who presents today for Anticoagulation monitoring. Indication: Atrial Fibrillation  INR Goal: 2.0-3.0. Current dose:  Coumadin 7.5 mg daily. Missed Coumadin Doses:  None  Medication Changes:  no  Dietary Changes:  no    Symptoms: taking coumadin appropriately without any bleeding. Latest INRs:  Lab Results   Component Value Date/Time    INR POC 2.1 2018 11:23 AM    INR POC 2.0 2018 11:15 AM    INR POC 2.0 2018 10:50 AM        New Coumadin dose:.current treatment plan is effective, no change in therapy. Next check to be scheduled for  1 month.     Navin Pierson is a [de-identified] y.o. female (: 1940) presenting to address:    Chief Complaint   Patient presents with    Anticoagulation    Depression     With anxiety       Vitals:    18 1121   BP: 140/70   Pulse: 84   Resp: 20   Temp: 98.6 °F (37 °C)   TempSrc: Oral   SpO2: 94%   Weight: 222 lb (100.7 kg)   Height: 5' 2\" (1.575 m)   PainSc:   0 - No pain     Depression Screening:     PHQ over the last two weeks 2017   PHQ Not Done Active Diagnosis of Depression or Bipolar Disorder   Little interest or pleasure in doing things -   Feeling down, depressed or hopeless -   Total Score PHQ 2 -   Trouble falling or staying asleep, or sleeping too much -   Feeling tired or having little energy -   Poor appetite or overeating -   Feeling bad about yourself - or that you are a failure or have let yourself or your family down -   Trouble concentrating on things such as school, work, reading or watching TV -   Moving or speaking so slowly that other people could have noticed; or the opposite being so fidgety that others notice -   Thoughts of being better off dead, or hurting yourself in some way -   PHQ 9 Score -   How difficult have these problems made it for you to do your work, take care of your home and get along with others -     Fall Risk Assessment:     Fall Risk Assessment, last 12 mths 2017   Able to walk? Yes   Fall in past 12 months? Yes   Fall with injury? Yes   Number of falls in past 12 months 1   Fall Risk Score 2     Abuse Screening:     Abuse Screening Questionnaire 12/4/2017   Do you ever feel afraid of your partner? N   Are you in a relationship with someone who physically or mentally threatens you? N   Is it safe for you to go home? Y     Coordination of Care Questionaire:   1. Have you been to the ER, urgent care clinic since your last visit? Hospitalized since your last visit? NO    2. Have you seen or consulted any other health care providers outside of the [address] [place] [address] since your last visit? Include any pap smears or colon screening. NO    Advanced Directive:   1. Do you have an Advanced Directive? YES    2. Would you like information on Advanced Directives?  NO

## 2018-07-12 NOTE — MR AVS SNAPSHOT
[address] [place] [address]  [address] [facility] [address] [phone] Patient: Alexis Stuart MRN: [id number] [id number] Visit Information Date & Time Provider Department Dept. Phone Encounter #  
 7/12/2018 11:15 AM [place], Luis Page [place] [id number] [phone] Upcoming Health Maintenance Date Due MICROALBUMIN Q1 7/21/2018 LIPID PANEL Q1 7/21/2018 EYE EXAM RETINAL OR DILATED Q1 8/3/2018 Influenza Age 5 to Adult 8/1/2018 HEMOGLOBIN A1C Q6M 9/5/2018 Pneumococcal 65+ Low/Medium Risk (2 of 2 - PPSV23) 12/4/2018 MEDICARE YEARLY EXAM 12/5/2018 FOOT EXAM Q1 7/12/2019 GLAUCOMA SCREENING Q2Y 8/3/2019 DTaP/Tdap/Td series (2 - Td) 9/6/2027 Allergies as of 7/12/2018  Review Complete On: 5/7/2018 By: [place] MD  
  
 Severity Noted Reaction Type Reactions Sulfa (Sulfonamide Antibiotics)  07/12/2017    Photosensitivity Wellbutrin [Bupropion Hcl]  07/12/2018    Itching Current Immunizations  Never Reviewed Name Date Influenza High Dose Vaccine PF 10/6/2017  1:17 PM  
  
 Not reviewed this visit You Were Diagnosed With   
  
 Codes Comments Type 2 diabetes mellitus without complication, without long-term current use of insulin (HCC)    -  Primary ICD-10-CM: E11.9 ICD-9-CM: 250.00 Chronic atrial fibrillation (HCC)     ICD-10-CM: O91.9 ICD-9-CM: 427.31 Depression with anxiety     ICD-10-CM: F41.8 ICD-9-CM: 300.4 Anticoagulation monitoring, INR range 2-3     ICD-10-CM: Z79.01 
ICD-9-CM: V58.61 Essential hypertension     ICD-10-CM: I10 
ICD-9-CM: 401.9 Vitals BP Pulse Temp Resp Height(growth percentile) Weight(growth percentile) 130/68 84 98.6 °F (37 °C) (Oral) 20 5' 2\" (1.575 m) 222 lb (100.7 kg) SpO2 BMI OB Status Smoking Status 94% 40.6 kg/m2 Hysterectomy Never Smoker Vitals History BMI and BSA Data Body Mass Index Body Surface Area  
 40.6 kg/m 2 2.1 m 2 Preferred Pharmacy Pharmacy Name Phone [address]bs [phone] Your Updated Medication List  
  
   
This list is accurate as of 7/12/18 11:48 AM.  Always use your most recent med list.  
  
  
  
  
 acetaminophen 500 mg tablet Commonly known as:  TYLENOL Take 500 mg by mouth every four (4) hours as needed. brinzolamide-brimonidine 1-0.2 % Drps Administer 1 Drop to both eyes two (2) times a day. cholecalciferol 1,000 unit tablet Commonly known as:  VITAMIN D3 Take 1 Tab by mouth daily. cyanocobalamin 500 mcg tablet Commonly known as:  VITAMIN B12 Take 500 mcg by mouth daily. dicyclomine 10 mg capsule Commonly known as:  BENTYL Take 1 Cap by mouth three (3) times daily as needed. dilTIAZem 360 mg ER capsule Commonly known as:  [facility] Take 1 Cap by mouth daily. latanoprost 0.005 % ophthalmic solution Commonly known as:  Conley Nageotte Administer 1 Drop to both eyes nightly. LOPRESSOR 50 mg tablet Generic drug:  metoprolol tartrate Take  by mouth two (2) times a day. losartan 50 mg tablet Commonly known as:  COZAAR Take 1 Tab by mouth two (2) times a day. omeprazole 20 mg capsule Commonly known as:  PRILOSEC Take 1 Cap by mouth daily. polyethylene glycol 17 gram/dose powder Commonly known as:  Tonna Troy Take 17 g by mouth daily. sertraline 50 mg tablet Commonly known as:  ZOLOFT Take 1 Tab by mouth daily. traZODone 50 mg tablet Commonly known as:  Cornelious Haymaker Take 1 Tab by mouth nightly as needed for Sleep.  
  
 triamcinolone acetonide 0.1 % ointment Commonly known as:  KENALOG Apply  to affected area two (2) times a day. use thin layer  
  
 warfarin 7.5 mg tablet Commonly known as:  COUMADIN  
TAKE 1 TABLET BY MOUTH ONCE DAILY Prescriptions Sent to Pharmacy Refills  
 sertraline (ZOLOFT) 50 mg tablet 1 Sig: Take 1 Tab by mouth daily. Class: Normal  
 [facility] [address] [place] [address]: [phone] Route: Oral  
 traZODone (DESYREL) 50 mg tablet 1 Sig: Take 1 Tab by mouth nightly as needed for Sleep. Class: Normal  
 Pharmacy: [address] [place] [address], [address] Ph #: [phone] Route: Oral  
 omeprazole (PRILOSEC) 20 mg capsule 0 Sig: Take 1 Cap by mouth daily. Class: Normal  
 [facility] [address] [place] [place] [address] Ph #: [phone] Route: Oral  
 dilTIAZem (TIAZAC) 360 mg ER capsule 1 Sig: Take 1 Cap by mouth daily. Class: Normal  
 Pharmacy: [address] [place] [address] Ph #: [phone] Route: Oral  
 warfarin (COUMADIN) 7.5 mg tablet 0 Sig: TAKE 1 TABLET BY MOUTH ONCE DAILY Class: Normal  
 Pharmacy: [address] [place] [address] Ph #: [phone] We Performed the Following AMB POC HEMOGLOBIN A1C [46563 CPT(R)] AMB POC PT/INR [89212 CPT(R)]  DIABETES FOOT EXAM [HM7 Custom] To-Do List   
 07/12/2018 Lab:  CBC W/O DIFF   
  
 07/12/2018 Lab:  LIPID PANEL   
  
 07/12/2018 Lab:  METABOLIC PANEL, COMPREHENSIVE   
  
 07/12/2018 Lab:  MICROALBUMIN, UR, RAND W/ MICROALB/CREAT RATIO Introducing [facility]! Michael Bradford introduces [employer] patient portal. Now you can access parts of your medical record, email your doctor's office, and request medication refills online. 1. In your internet browser, go to [email or web address] 2. Click on the First Time User? Click Here link in the Sign In box. You will see the New Member Sign Up page. 3. Enter your MyChart Access Code exactly as it appears below.  You will not need to use this code after youve completed the sign-up process. If you do not sign up before the expiration date, you must request a new code. · [employer] Access Code: [email or web address] Expires: 10/10/2018 11:47 AM 
 
4. Enter the last four digits of your Social Security Number (xxxx) and Date of Birth (mm/dd/yyyy) as indicated and click Submit. You will be taken to the next sign-up page. 5. Create a [employer] ID. This will be your [employer] login ID and cannot be changed, so think of one that is secure and easy to remember. 6. Create a [employer] password. You can change your password at any time. 7. Enter your Password Reset Question and Answer. This can be used at a later time if you forget your password. 8. Enter your e-mail address. You will receive e-mail notification when new information is available in [address] 9. Click Sign Up. You can now view and download portions of your medical record. 10. Click the Download Summary menu link to download a portable copy of your medical information. If you have questions, please visit the Frequently Asked Questions section of the [employer] website. Remember, [employer] is NOT to be used for urgent needs. For medical emergencies, dial 911. Now available from your iPhone and Android! Please provide this summary of care documentation to your next provider. Your primary care clinician is listed as Jorge 13. If you have any questions after today's visit, please call [phone].

## 2018-07-13 LAB
ALBUMIN SERPL-MCNC: 3.8 G/DL (ref 3.5–4.7)
ALBUMIN/CREAT UR: 16.3 MG/G CREAT (ref 0–30)
ALBUMIN/GLOB SERPL: 1.2 {RATIO} (ref 1.2–2.2)
ALP SERPL-CCNC: 141 IU/L (ref 39–117)
ALT SERPL-CCNC: 24 IU/L (ref 0–32)
AST SERPL-CCNC: 52 IU/L (ref 0–40)
BILIRUB SERPL-MCNC: 0.5 MG/DL (ref 0–1.2)
BUN SERPL-MCNC: 11 MG/DL (ref 8–27)
BUN/CREAT SERPL: 15 (ref 12–28)
CALCIUM SERPL-MCNC: 10.1 MG/DL (ref 8.7–10.3)
CHLORIDE SERPL-SCNC: 102 MMOL/L (ref 96–106)
CHOLEST SERPL-MCNC: 171 MG/DL (ref 100–199)
CO2 SERPL-SCNC: 23 MMOL/L (ref 20–29)
CREAT SERPL-MCNC: 0.72 MG/DL (ref 0.57–1)
CREAT UR-MCNC: 142.8 MG/DL
ERYTHROCYTE [DISTWIDTH] IN BLOOD BY AUTOMATED COUNT: 16.4 % (ref 12.3–15.4)
GLOBULIN SER CALC-MCNC: 3.3 G/DL (ref 1.5–4.5)
GLUCOSE SERPL-MCNC: 127 MG/DL (ref 65–99)
HCT VFR BLD AUTO: 33.8 % (ref 34–46.6)
HDLC SERPL-MCNC: 42 MG/DL
HGB BLD-MCNC: 10.5 G/DL (ref 11.1–15.9)
INTERPRETATION, 910389: NORMAL
LDLC SERPL CALC-MCNC: 99 MG/DL (ref 0–99)
Lab: NORMAL
MCH RBC QN AUTO: 25 PG (ref 26.6–33)
MCHC RBC AUTO-ENTMCNC: 31.1 G/DL (ref 31.5–35.7)
MCV RBC AUTO: 81 FL (ref 79–97)
MICROALBUMIN UR-MCNC: 23.3 UG/ML
PLATELET # BLD AUTO: 282 X10E3/UL (ref 150–379)
POTASSIUM SERPL-SCNC: 4.9 MMOL/L (ref 3.5–5.2)
PROT SERPL-MCNC: 7.1 G/DL (ref 6–8.5)
RBC # BLD AUTO: 4.2 X10E6/UL (ref 3.77–5.28)
SODIUM SERPL-SCNC: 143 MMOL/L (ref 134–144)
TRIGL SERPL-MCNC: 149 MG/DL (ref 0–149)
VLDLC SERPL CALC-MCNC: 30 MG/DL (ref 5–40)
WBC # BLD AUTO: 7.3 X10E3/UL (ref 3.4–10.8)

## 2018-07-16 NOTE — PROGRESS NOTES
Tell pt her blood count is slightly low. I want to repeat that at her visit next month. Make sure she is taking prilosec daily. Otherwise, labs are good.

## 2018-07-26 RX ORDER — METOPROLOL TARTRATE 50 MG/1
50 TABLET ORAL 2 TIMES DAILY
Qty: 180 TAB | Refills: 0 | Status: SHIPPED | OUTPATIENT
Start: 2018-07-26 | End: 2018-10-04 | Stop reason: SDUPTHER

## 2018-07-26 NOTE — TELEPHONE ENCOUNTER
Patient pharmacy is requesting a med refill of Metoprolol 50 mg    Last visit: 5/7/18    Last refill: unknown

## 2018-08-08 RX ORDER — OMEPRAZOLE 20 MG/1
CAPSULE, DELAYED RELEASE ORAL
Qty: 30 CAP | Refills: 0 | Status: SHIPPED | OUTPATIENT
Start: 2018-08-08 | End: 2018-09-06 | Stop reason: SDUPTHER

## 2018-08-13 ENCOUNTER — CLINICAL SUPPORT (OUTPATIENT)
Dept: FAMILY MEDICINE CLINIC | Age: 78
End: 2018-08-13

## 2018-08-13 VITALS
SYSTOLIC BLOOD PRESSURE: 130 MMHG | OXYGEN SATURATION: 95 % | HEIGHT: 62 IN | RESPIRATION RATE: 20 BRPM | TEMPERATURE: 98.4 F | DIASTOLIC BLOOD PRESSURE: 80 MMHG | HEART RATE: 84 BPM | WEIGHT: 223 LBS | BODY MASS INDEX: 41.04 KG/M2

## 2018-08-13 DIAGNOSIS — I48.20 CHRONIC ATRIAL FIBRILLATION (HCC): Primary | ICD-10-CM

## 2018-08-13 LAB
INR BLD: 2.3
PT POC: 27.1 SECONDS
VALID INTERNAL CONTROL?: YES

## 2018-08-13 NOTE — PROGRESS NOTES
Rama Olsen is a [de-identified] y.o. female who presents today for Anticoagulation monitoring. Indication: Atrial Fibrillation  INR Goal: 2.0-3.0. Current dose:  Coumadin 7.5 mg daily. Missed Coumadin Doses:  None  Medication Changes:  no  Dietary Changes:  no    Symptoms: taking coumadin appropriately without any bleeding. Latest INRs:  Lab Results   Component Value Date/Time    INR POC 2.3 08/13/2018 11:14 AM    INR POC 2.1 07/12/2018 11:23 AM    INR POC 2.0 06/07/2018 11:15 AM        New Coumadin dose:.current treatment plan is effective, no change in therapy. Next check to be scheduled for  1 month.

## 2018-09-13 ENCOUNTER — CLINICAL SUPPORT (OUTPATIENT)
Dept: FAMILY MEDICINE CLINIC | Age: 78
End: 2018-09-13

## 2018-09-13 VITALS — HEART RATE: 64 BPM | DIASTOLIC BLOOD PRESSURE: 70 MMHG | SYSTOLIC BLOOD PRESSURE: 130 MMHG

## 2018-09-13 DIAGNOSIS — I48.20 CHRONIC ATRIAL FIBRILLATION (HCC): Primary | ICD-10-CM

## 2018-09-13 LAB
INR BLD: 2.6
PT POC: 31.3 SECONDS
VALID INTERNAL CONTROL?: YES

## 2018-09-13 NOTE — PROGRESS NOTES
Rama Olsen is a [de-identified] y.o. female who presents today for Anticoagulation monitoring. Indication: atrial fib    INR Goal: 2.0-3.0. Current dose:  Coumadin 7.5mg daily. Missed Coumadin Doses:  None  Medication Changes:  no  Dietary Changes:  no    Symptoms: taking coumadin appropriately without any bleeding. Latest INRs:  Lab Results   Component Value Date/Time    INR POC 2.6 09/13/2018 12:13 PM    INR POC 2.3 08/13/2018 11:14 AM    INR POC 2.1 07/12/2018 11:23 AM        New Coumadin dose:.current treatment plan is effective, no change in therapy. Next check to be scheduled for  1 weeks. Pt will return in 1 month for inr check with her doctor follow-up.

## 2018-09-18 PROBLEM — E11.3291 MILD NONPROLIFERATIVE DIABETIC RETINOPATHY OF RIGHT EYE WITHOUT MACULAR EDEMA ASSOCIATED WITH TYPE 2 DIABETES MELLITUS (HCC): Status: ACTIVE | Noted: 2018-09-18

## 2018-09-18 PROBLEM — E11.3291 TYPE 2 DIABETES MELLITUS WITH RIGHT EYE AFFECTED BY MILD NONPROLIFERATIVE RETINOPATHY WITHOUT MACULAR EDEMA, WITHOUT LONG-TERM CURRENT USE OF INSULIN (HCC): Status: ACTIVE | Noted: 2017-07-24

## 2018-09-24 DIAGNOSIS — K59.00 CONSTIPATION, UNSPECIFIED CONSTIPATION TYPE: ICD-10-CM

## 2018-09-24 RX ORDER — POLYETHYLENE GLYCOL 3350 17 G/17G
17 POWDER, FOR SOLUTION ORAL DAILY
Qty: 765 G | Refills: 1 | Status: SHIPPED | OUTPATIENT
Start: 2018-09-24 | End: 2019-01-17 | Stop reason: SDUPTHER

## 2018-09-24 RX ORDER — LOSARTAN POTASSIUM 50 MG/1
TABLET ORAL
Qty: 180 TAB | Refills: 0 | Status: SHIPPED | OUTPATIENT
Start: 2018-09-24 | End: 2018-10-04 | Stop reason: SDUPTHER

## 2018-10-04 ENCOUNTER — OFFICE VISIT (OUTPATIENT)
Dept: FAMILY MEDICINE CLINIC | Age: 78
End: 2018-10-04

## 2018-10-04 VITALS
WEIGHT: 223 LBS | BODY MASS INDEX: 41.04 KG/M2 | RESPIRATION RATE: 20 BRPM | TEMPERATURE: 98.6 F | HEART RATE: 73 BPM | OXYGEN SATURATION: 94 % | SYSTOLIC BLOOD PRESSURE: 140 MMHG | DIASTOLIC BLOOD PRESSURE: 68 MMHG | HEIGHT: 62 IN

## 2018-10-04 DIAGNOSIS — I48.20 CHRONIC ATRIAL FIBRILLATION (HCC): Primary | ICD-10-CM

## 2018-10-04 DIAGNOSIS — E11.3291 TYPE 2 DIABETES MELLITUS WITH RIGHT EYE AFFECTED BY MILD NONPROLIFERATIVE RETINOPATHY WITHOUT MACULAR EDEMA, WITHOUT LONG-TERM CURRENT USE OF INSULIN (HCC): ICD-10-CM

## 2018-10-04 DIAGNOSIS — Z01.810 PREOP CARDIOVASCULAR EXAM: ICD-10-CM

## 2018-10-04 DIAGNOSIS — I10 ESSENTIAL HYPERTENSION: ICD-10-CM

## 2018-10-04 DIAGNOSIS — F41.8 DEPRESSION WITH ANXIETY: ICD-10-CM

## 2018-10-04 LAB
INR BLD: 2.2
PT POC: 25.9 SECONDS
VALID INTERNAL CONTROL?: YES

## 2018-10-04 RX ORDER — METOPROLOL TARTRATE 50 MG/1
50 TABLET ORAL 2 TIMES DAILY
Qty: 180 TAB | Refills: 1 | Status: SHIPPED | OUTPATIENT
Start: 2018-10-04 | End: 2019-03-12 | Stop reason: SDUPTHER

## 2018-10-04 RX ORDER — LOSARTAN POTASSIUM 50 MG/1
TABLET ORAL
Qty: 180 TAB | Refills: 1 | Status: SHIPPED | OUTPATIENT
Start: 2018-10-04 | End: 2019-03-12 | Stop reason: SDUPTHER

## 2018-10-04 RX ORDER — SERTRALINE HYDROCHLORIDE 50 MG/1
50 TABLET, FILM COATED ORAL DAILY
Qty: 90 TAB | Refills: 1 | Status: SHIPPED | OUTPATIENT
Start: 2018-10-04 | End: 2019-03-12 | Stop reason: SDUPTHER

## 2018-10-04 RX ORDER — TRAZODONE HYDROCHLORIDE 50 MG/1
50 TABLET ORAL
Qty: 90 TAB | Refills: 1 | Status: SHIPPED | OUTPATIENT
Start: 2018-10-04 | End: 2019-03-12 | Stop reason: SDUPTHER

## 2018-10-04 RX ORDER — WARFARIN 7.5 MG/1
TABLET ORAL
Qty: 90 TAB | Refills: 0 | Status: SHIPPED | OUTPATIENT
Start: 2018-10-04 | End: 2019-01-28 | Stop reason: DRUGHIGH

## 2018-10-04 RX ORDER — DILTIAZEM HYDROCHLORIDE 360 MG/1
360 CAPSULE, EXTENDED RELEASE ORAL DAILY
Qty: 90 CAP | Refills: 1 | Status: SHIPPED | OUTPATIENT
Start: 2018-10-04 | End: 2019-03-12 | Stop reason: SDUPTHER

## 2018-10-04 NOTE — PROGRESS NOTES
Neelam Thomas is a [de-identified] y.o. female (: 1940) presenting to address: Chief Complaint Patient presents with  Pre-op Exam  
 
 
Vitals:  
 10/04/18 6825 BP: 140/68 Pulse: 73 Resp: 20 Temp: 98.6 °F (37 °C) TempSrc: Oral  
SpO2: 94% Weight: 223 lb (101.2 kg) Height: 5' 2\" (1.575 m) PainSc:   8 PainLoc: Back Depression Screening: PHQ over the last two weeks 10/4/2018 PHQ Not Done Active Diagnosis of Depression or Bipolar Disorder Little interest or pleasure in doing things - Feeling down, depressed, irritable, or hopeless - Total Score PHQ 2 - Trouble falling or staying asleep, or sleeping too much - Feeling tired or having little energy - Poor appetite, weight loss, or overeating - Feeling bad about yourself - or that you are a failure or have let yourself or your family down - Trouble concentrating on things such as school, work, reading, or watching TV - Moving or speaking so slowly that other people could have noticed; or the opposite being so fidgety that others notice - Thoughts of being better off dead, or hurting yourself in some way -  
PHQ 9 Score - How difficult have these problems made it for you to do your work, take care of your home and get along with others - Fall Risk Assessment:  
 
Fall Risk Assessment, last 12 mths 2017 Able to walk? Yes Fall in past 12 months? Yes Fall with injury? Yes  
Number of falls in past 12 months 1 Fall Risk Score 2 Abuse Screening:  
 
Abuse Screening Questionnaire 2017 Do you ever feel afraid of your partner? Henretta Expose Are you in a relationship with someone who physically or mentally threatens you? Henretta Expose Is it safe for you to go home? Newell Dakins Coordination of Care Questionaire: 1. Have you been to the ER, urgent care clinic since your last visit? Hospitalized since your last visit? NO 
 
2.  Have you seen or consulted any other health care providers outside of the [address] [place] [address] since your last visit? Include any pap smears or colon screening. YES ophthalmology 9/8/18 Advanced Directive: 1. Do you have an Advanced Directive? NO 
 
2. Would you like information on Advanced Directives?  YES

## 2018-10-04 NOTE — PROGRESS NOTES
Shankar Thurman is a [de-identified] y.o. female and presents for pre-operative evaluation. Subjective: This patient was seen at the request of Dr. Luís Veloz for pre-operative evaluation for planned procedure: R cataract surgery on 10/10/18 under local anesthesia. Cardiac factors include: 
DMII, A1c 6.8 HTN Age>70 METs: 4 
 
ROS: 
Constitutional: No recent weight change. No weakness/fatigue. No f/c. Skin: No rashes, change in nails/hair, itching HENT: No HA, dizziness. No hearing loss/tinnitus. No nasal congestion/discharge. Eyes: No change in vision, double/blurred vision or eye pain/redness. Cardiovascular: No CP/palpitations. No CHO/orthopnea/PND. Respiratory: No cough/sputum, dyspnea, wheezing. Gastointestinal: No dysphagia, reflux. No n/v. No constipation/diarrhea. No melena/rectal bleeding. Genitourinary: No dysuria, urinary hesitancy, nocturia, hematuria. No incontinence. Musculoskeletal: No joint pain/stiffness. No muscle pain/tenderness. Heme: No h/o anemia. No easy bleeding/bruising. Neurological: No seizures/numbness/weakness. No paresthesias. PMH: 
Patient Active Problem List  
Diagnosis Code  Osteopenia of multiple sites M85.89  
 ANA CRISTINA (obstructive sleep apnea) G47.33  
 A-fib ([facility]) I48.91  
 Right bundle branch block (RBBB) I45.10  Type 2 diabetes mellitus with right eye affected by mild nonproliferative retinopathy without macular edema, without long-term current use of insulin ([address] H42.7758  Anticoagulation monitoring, INR range 2-3 Z79.01  Wheezing R06.2  Depression with anxiety F41.8  Severe obesity (BMI >= 40) ([facility]) E66.01  
 Essential hypertension I10  
 DDD (degenerative disc disease), thoracolumbar M51.35  
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 
Past Surgical History:  
Procedure Laterality Date  HX APPENDECTOMY  HX MASTECTOMY    
 right SH: Social History Substance Use Topics  Smoking status: Never Smoker  Smokeless tobacco: Never Used  Alcohol use No  
 
 
FH: 
Family History Problem Relation Age of Onset  Asthma Mother  Hypertension Father  Heart Attack Father Medications/Allergies: 
Current Outpatient Prescriptions on File Prior to Visit Medication Sig Dispense Refill  losartan (COZAAR) 50 mg tablet TAKE 1 TABLET BY MOUTH TWICE DAILY 180 Tab 0  
 polyethylene glycol (MIRALAX) 17 gram/dose powder Take 17 g by mouth daily. 765 g 1  
 omeprazole (PRILOSEC) 20 mg capsule TAKE 1 CAPSULE BY MOUTH ONCE DAILY 90 Cap 0  
 metoprolol tartrate (LOPRESSOR) 50 mg tablet Take 1 Tab by mouth two (2) times a day. 180 Tab 0  
 sertraline (ZOLOFT) 50 mg tablet Take 1 Tab by mouth daily. 90 Tab 1  
 traZODone (DESYREL) 50 mg tablet Take 1 Tab by mouth nightly as needed for Sleep. 90 Tab 1  
 dilTIAZem (TIAZAC) 360 mg ER capsule Take 1 Cap by mouth daily. 90 Cap 1  
 warfarin (COUMADIN) 7.5 mg tablet TAKE 1 TABLET BY MOUTH ONCE DAILY 90 Tab 0  
 dicyclomine (BENTYL) 10 mg capsule Take 1 Cap by mouth three (3) times daily as needed. 90 Cap 0  cholecalciferol (VITAMIN D3) 1,000 unit tablet Take 1 Tab by mouth daily.  acetaminophen (TYLENOL) 500 mg tablet Take 500 mg by mouth every four (4) hours as needed.  brinzolamide-brimonidine 1-0.2 % drps Administer 1 Drop to both eyes two (2) times a day.  latanoprost (XALATAN) 0.005 % ophthalmic solution Administer 1 Drop to both eyes nightly.  cyanocobalamin (VITAMIN B12) 500 mcg tablet Take 500 mcg by mouth daily.  triamcinolone acetonide (KENALOG) 0.1 % ointment Apply  to affected area two (2) times a day. use thin layer 30 g 0 No current facility-administered medications on file prior to visit. Allergies Allergen Reactions  Sulfa (Sulfonamide Antibiotics) Photosensitivity  Wellbutrin [Bupropion Hcl] Itching Objective: 
Visit Vitals  /68 (BP 1 Location: Left arm, BP Patient Position: Sitting)  Pulse 73  Temp 98.6 °F (37 °C) (Oral)  Resp 20  
 Ht 5' 2\" (1.575 m)  Wt 223 lb (101.2 kg)  SpO2 94%  BMI 40.79 kg/m2 Gen: Well developed, nourished, no distress, alert, obese habitus HENT: Exterior ears and tympanic membranes normal bilaterally. Supple neck. No thyromegaly or lymphadenopathy. Oropharynx clear and moist mucous membranes. Eyes: Conjunctiva normal. PERRL. CV: S1, S2.  2/6 CLIVE NORRIS w/o radiation  No murmurs/rubs. No thrills palpated. No carotid bruits. Intact distal pulses. No edema. Pulm: No abnormalities on inspection. Clear to auscultation bilaterally. No wheezing/rhonchi. Normal effort. GI: Soft, nontender, nondistended. Normal active bowel sounds. No  masses on palpation. No hepatosplenomegaly. MS: Gait normal.      
Neuro: A/O x 3. Speech normal.  
Psych: Appropriate affect, judgement and insight. Short-term memory intact. Labwork and Ancillary Studies: CBC w/Diff Lab Results Component Value Date/Time WBC 7.3 07/12/2018 11:58 AM  
 HGB 10.5 (L) 07/12/2018 11:58 AM  
 PLATELET 078 63/42/7927 11:58 AM  
  
 
 Basic Metabolic Profile/LFTs Lab Results Component Value Date/Time Sodium 143 07/12/2018 11:58 AM  
 Potassium 4.9 07/12/2018 11:58 AM  
 Chloride 102 07/12/2018 11:58 AM  
 CO2 23 07/12/2018 11:58 AM  
 Glucose 127 (H) 07/12/2018 11:58 AM  
 BUN 11 07/12/2018 11:58 AM  
 Creatinine 0.72 07/12/2018 11:58 AM  
 BUN/Creatinine ratio 15 07/12/2018 11:58 AM  
 GFR est AA 91 07/12/2018 11:58 AM  
 GFR est non-AA 79 07/12/2018 11:58 AM  
 Calcium 10.1 07/12/2018 11:58 AM  
  
Lab Results Component Value Date/Time ALT (SGPT) 24 07/12/2018 11:58 AM  
 AST (SGOT) 52 (H) 07/12/2018 11:58 AM  
 Alk.  phosphatase 141 (H) 07/12/2018 11:58 AM  
 Bilirubin, total 0.5 07/12/2018 11:58 AM  
 
 
 Cholesterol Lab Results Component Value Date/Time Cholesterol, total 171 07/12/2018 11:58 AM  
 HDL Cholesterol 42 07/12/2018 11:58 AM  
 LDL, calculated 99 07/12/2018 11:58 AM  
 Triglyceride 149 07/12/2018 11:58 AM  
 
 
 
INR 2.2 Assessment/Plan: The patient is low risk for planned procedure and may proceed to OR without further testing. The following recommendations were made for medication regimen prior to surgery: 
Continue coumadin. Hold NSAIDs 7 days prior to surgery. I will continue to follow along with the patient's treatment and care. For any questions please do not hesitate to call the office at [phone].  
 
 
Coretta Ann MD

## 2018-10-04 NOTE — MR AVS SNAPSHOT
[address] [facility] [address] 
 
 
 [address] [facility] [address] 
[phone] Patient: Alireza Mckeon MRN: [id number] UIR:[birth year] Visit Information Date & Time Provider Department Dept. Phone Encounter #  
 10/4/2018  9:00 AM Kailash Nino, 3 [facility] [phone] [id number] Your Appointments 10/15/2018 11:15 AM  
Follow Up with Kailash Nino MD  
3 [facility] Appt Note: f/u appointment needed repeat INR as well [address] [facility] [address] [facility]  
  
   
 [address] [facility]  
  
    
 12/5/2018 10:00 AM  
Office Visit with Kailash Nino MD  
3 [facility]) Appt Note: MWE. ..kmb  
 [address] [facility] [address]  
[phone]  
  
   
 [address] [facility] [address] [facility] Upcoming Health Maintenance Date Due Shingrix Vaccine Age 50> (1 of 2) 4/21/1988 Influenza Age 5 to Adult 8/1/2018 Pneumococcal 65+ Low/Medium Risk (2 of 2 - PPSV23) 12/4/2018 MEDICARE YEARLY EXAM 12/5/2018 HEMOGLOBIN A1C Q6M 1/12/2019 FOOT EXAM Q1 7/12/2019 MICROALBUMIN Q1 7/12/2019 LIPID PANEL Q1 7/12/2019 EYE EXAM RETINAL OR DILATED Q1 9/18/2019 GLAUCOMA SCREENING Q2Y 9/18/2020 DTaP/Tdap/Td series (2 - Td) 9/6/2027 Allergies as of 10/4/2018  Review Complete On: 10/4/2018 By: Kailash Nino MD  
  
 Severity Noted Reaction Type Reactions Sulfa (Sulfonamide Antibiotics)  07/12/2017    Photosensitivity Wellbutrin [Bupropion Hcl]  07/12/2018    Itching Current Immunizations  Never Reviewed Name Date Influenza High Dose Vaccine PF 10/6/2017  1:17 PM  
  
 Not reviewed this visit You Were Diagnosed With   
  
 Codes Comments Chronic atrial fibrillation (HCC)    -  Primary ICD-10-CM: T96.4 ICD-9-CM: 427.31 Type 2 diabetes mellitus with right eye affected by mild nonproliferative retinopathy without macular edema, without long-term current use of insulin ([facility]     ICD-10-CM: U14.4457 ICD-9-CM: 250.50, 362.04 Essential hypertension     ICD-10-CM: I10 
ICD-9-CM: 401.9 Preop cardiovascular exam     ICD-10-CM: Z01.810 ICD-9-CM: V72.81 Depression with anxiety     ICD-10-CM: F41.8 ICD-9-CM: 300.4 Vitals BP Pulse Temp Resp Height(growth percentile) Weight(growth percentile) 140/68 (BP 1 Location: Left arm, BP Patient Position: Sitting) 73 98.6 °F (37 °C) (Oral) 20 5' 2\" (1.575 m) 223 lb (101.2 kg) SpO2 BMI OB Status Smoking Status 94% 40.79 kg/m2 Hysterectomy Never Smoker Vitals History BMI and BSA Data Body Mass Index Body Surface Area 40.79 kg/m 2 2.1 m 2 Preferred Pharmacy Pharmacy Name Phone [address] [facility] [address] Kandis Fitch [phone] Your Updated Medication List  
  
   
This list is accurate as of 10/4/18  9:16 AM.  Always use your most recent med list.  
  
  
  
  
 acetaminophen 500 mg tablet Commonly known as:  TYLENOL Take 500 mg by mouth every four (4) hours as needed. brinzolamide-brimonidine 1-0.2 % Drps Administer 1 Drop to both eyes two (2) times a day. cholecalciferol 1,000 unit tablet Commonly known as:  VITAMIN D3 Take 1 Tab by mouth daily. cyanocobalamin 500 mcg tablet Commonly known as:  VITAMIN B12 Take 500 mcg by mouth daily. dicyclomine 10 mg capsule Commonly known as:  BENTYL Take 1 Cap by mouth three (3) times daily as needed. dilTIAZem 360 mg ER capsule Commonly known as:  [facility] Take 1 Cap by mouth daily. latanoprost 0.005 % ophthalmic solution Commonly known as:  Rigoberto Irving Administer 1 Drop to both eyes nightly. losartan 50 mg tablet Commonly known as:  COZAAR  
 TAKE 1 TABLET BY MOUTH TWICE DAILY  
  
 metoprolol tartrate 50 mg tablet Commonly known as:  LOPRESSOR Take 1 Tab by mouth two (2) times a day. omeprazole 20 mg capsule Commonly known as:  PRILOSEC  
TAKE 1 CAPSULE BY MOUTH ONCE DAILY polyethylene glycol 17 gram/dose powder Commonly known as:  Dortha Cure Take 17 g by mouth daily. sertraline 50 mg tablet Commonly known as:  ZOLOFT Take 1 Tab by mouth daily. traZODone 50 mg tablet Commonly known as:  Sherita Darío Take 1 Tab by mouth nightly as needed for Sleep.  
  
 triamcinolone acetonide 0.1 % ointment Commonly known as:  KENALOG Apply  to affected area two (2) times a day. use thin layer  
  
 warfarin 7.5 mg tablet Commonly known as:  COUMADIN  
TAKE 1 TABLET BY MOUTH ONCE DAILY Prescriptions Sent to Pharmacy Refills  
 metoprolol tartrate (LOPRESSOR) 50 mg tablet 1 Sig: Take 1 Tab by mouth two (2) times a day. Class: Normal  
 Pharm[facility]: [address] [place] [address]: [phone] Route: Oral  
 warfarin (COUMADIN) 7.5 mg tablet 0 Sig: TAKE 1 TABLET BY MOUTH ONCE DAILY Class: Normal  
 Pharmacy: [address] [place], [place] [address]santiago Perez Ph #: [phone]  
 dilTIAZem PULID[facility]) 360 mg ER capsule 1 Sig: Take 1 Cap by mouth daily. Class: Normal  
 Pharmacy: [address] [place] [address] Hilario Perez Ph #: [phone] Route: Oral  
 losartan (COZAAR) 50 mg tablet 1 Sig: TAKE 1 TABLET BY MOUTH TWICE DAILY Class: Normal  
 Pharmacy: [address] [place] [address], [facility]ilario [facility] #: [phone]  
 sertraline (ZOLOFT) 50 mg tablet 1 Sig: Take 1 Tab by mouth daily. Class: Normal  
 Pharmacy: [address] [place] [address], [facility] Ph #: [phone]  Route: Oral  
 traZODone (DESYREL) 50 mg tablet 1  
 Sig: Take 1 Tab by mouth nightly as needed for Sleep. Class: Normal  
 Pharmacy: [facility] [address] VETO PARADA [address] [place] [address], [facility]  Dutch Vallecillo  #: [phone] Route: Oral  
  
We Performed the Following AMB POC PT/INR [47661 CPT(R)] Introducing [facility]! Inna Galvan introduces [employer] patient portal. Now you can access parts of your medical record, email your doctor's office, and request medication refills online. 1. In your internet browser, go to [email or web address] 2. Click on the First Time User? Click Here link in the Sign In box. You will see the New Member Sign Up page. 3. Enter your [employer] Access Code exactly as it appears below. You will not need to use this code after youve completed the sign-up process. If you do not sign up before the expiration date, you must request a new code. · [employer] Access Code: [email or web address] Expires: 10/10/2018 11:47 AM 
 
4. Enter the last four digits of your Social Security Number (xxxx) and Date of Birth (mm/dd/yyyy) as indicated and click Submit. You will be taken to the next sign-up page. 5. Create a [employer] ID. This will be your [employer] login ID and cannot be changed, so think of one that is secure and easy to remember. 6. Create a [employer] password. You can change your password at any time. 7. Enter your Password Reset Question and Answer. This can be used at a later time if you forget your password. 8. Enter your e-mail address. You will receive e-mail notification when new information is available in [address] 9. Click Sign Up. You can now view and download portions of your medical record. 10. Click the Download Summary menu link to download a portable copy of your medical information. If you have questions, please visit the Frequently Asked Questions section of the [employer] website. Remember, [employer] is NOT to be used for urgent needs. For medical emergencies, dial 911. Now available from your iPhone and Android! Please provide this summary of care documentation to your next provider. Your primary care clinician is listed as Jorge Sharp. If you have any questions after today's visit, please call [phone].

## 2018-11-07 ENCOUNTER — CLINICAL SUPPORT (OUTPATIENT)
Dept: FAMILY MEDICINE CLINIC | Age: 78
End: 2018-11-07

## 2018-11-07 DIAGNOSIS — I48.20 CHRONIC ATRIAL FIBRILLATION (HCC): Primary | ICD-10-CM

## 2018-11-07 LAB
INR BLD: 2.5
PT POC: 29.5 SECONDS
VALID INTERNAL CONTROL?: YES

## 2018-11-27 ENCOUNTER — OFFICE VISIT (OUTPATIENT)
Dept: FAMILY MEDICINE CLINIC | Age: 78
End: 2018-11-27

## 2018-11-27 VITALS
SYSTOLIC BLOOD PRESSURE: 142 MMHG | RESPIRATION RATE: 20 BRPM | TEMPERATURE: 98.3 F | HEART RATE: 78 BPM | BODY MASS INDEX: 41.59 KG/M2 | DIASTOLIC BLOOD PRESSURE: 70 MMHG | HEIGHT: 62 IN | WEIGHT: 226 LBS | OXYGEN SATURATION: 95 %

## 2018-11-27 DIAGNOSIS — J04.0 VIRAL LARYNGITIS: ICD-10-CM

## 2018-11-27 DIAGNOSIS — J06.9 VIRAL URI WITH COUGH: Primary | ICD-10-CM

## 2018-11-27 DIAGNOSIS — B97.89 VIRAL LARYNGITIS: ICD-10-CM

## 2018-11-27 DIAGNOSIS — R06.2 WHEEZING: ICD-10-CM

## 2018-11-27 DIAGNOSIS — I48.20 CHRONIC ATRIAL FIBRILLATION (HCC): ICD-10-CM

## 2018-11-27 LAB
INR BLD: 2.4
PT POC: 28.3 SECONDS
VALID INTERNAL CONTROL?: YES

## 2018-11-27 RX ORDER — FLUCONAZOLE 150 MG/1
150 TABLET ORAL DAILY
Qty: 1 TAB | Refills: 0 | Status: SHIPPED | OUTPATIENT
Start: 2018-11-27 | End: 2018-11-28

## 2018-11-27 RX ORDER — FLUTICASONE PROPIONATE 110 UG/1
2 AEROSOL, METERED RESPIRATORY (INHALATION) EVERY 12 HOURS
Qty: 1 INHALER | Refills: 0 | Status: SHIPPED | OUTPATIENT
Start: 2018-11-27

## 2018-11-27 NOTE — PROGRESS NOTES
Deangelo Hernandez is a [de-identified] y.o. female (: 1940) presenting to address: Chief Complaint Patient presents with  Cold Symptoms  Laryngitis Vitals:  
 18 4543 BP: 142/70 Pulse: 78 Resp: 20 Temp: 98.3 °F (36.8 °C) TempSrc: Oral  
SpO2: 95% Weight: 226 lb (102.5 kg) Height: 5' 2\" (1.575 m) PainSc:   0 - No pain Depression Screening: PHQ over the last two weeks 10/4/2018 PHQ Not Done Active Diagnosis of Depression or Bipolar Disorder Little interest or pleasure in doing things - Feeling down, depressed, irritable, or hopeless - Total Score PHQ 2 - Trouble falling or staying asleep, or sleeping too much - Feeling tired or having little energy - Poor appetite, weight loss, or overeating - Feeling bad about yourself - or that you are a failure or have let yourself or your family down - Trouble concentrating on things such as school, work, reading, or watching TV - Moving or speaking so slowly that other people could have noticed; or the opposite being so fidgety that others notice - Thoughts of being better off dead, or hurting yourself in some way -  
PHQ 9 Score - How difficult have these problems made it for you to do your work, take care of your home and get along with others - Fall Risk Assessment:  
 
Fall Risk Assessment, last 12 mths 2017 Able to walk? Yes Fall in past 12 months? Yes Fall with injury? Yes  
Number of falls in past 12 months 1 Fall Risk Score 2 Abuse Screening:  
 
Abuse Screening Questionnaire 2017 Do you ever feel afraid of your partner? Citlali Garza Are you in a relationship with someone who physically or mentally threatens you? Citlali Garza Is it safe for you to go home? Jun Campo Coordination of Care Questionaire: 1. Have you been to the ER, urgent care clinic since your last visit? Hospitalized since your last visit? YES patient first 18 & 18 2. Have you seen or consulted any other health care providers outside of the [address] [place] [address] since your last visit? Include any pap smears or colon screening. NO Advanced Directive: 1. Do you have an Advanced Directive? YES 
 
2. Would you like information on Advanced Directives?  NO

## 2018-11-27 NOTE — PROGRESS NOTES
After obtaining consent, and per orders of Dr. Dread Solano  injection of Solu-medrol  given by Maritza Farris LPN. Patient instructed to remain in clinic for 20 minutes afterwards, and to report any adverse reaction to me immediately.

## 2018-11-27 NOTE — PROGRESS NOTES
Chief Complaint Patient presents with  Cold Symptoms  Laryngitis Assessment/Plan 1. Viral URI with cough 
- methylPREDNISolone, PF, (SOLU-MEDROL) 125 mg/2 mL solr; 2 mL by IntraMUSCular route now for 1 dose. Dispense: 1 Vial; Refill: 0 
- METHYLPREDNISOLONE INJECTION 
- MS THER/PROPH/DIAG INJECTION, SUBCUT/IM 2. Wheezing 
-start flovent. Cont albuterol prn. Suspect RAD. 3. Viral laryngitis 4. afib - ck inr given recent antibiotic use. INR 2.4. Toya in 1 mo The plan was discussed with the patient. The patient verbalized understanding and is in agreement with the plan. All medication potential side effects were discussed with the patient. SUBJECTIVE:  
Fabricio Knutson is a [de-identified] y.o. female who complains of productive cough and nasal congestion and vocal hoarseness and ear fullness. No f/c. Endorses wheezing. Was seen 11/20 in urgent care. I reviewed those records- xray was negative. Was given albuterol, doxy, prednisone and robitussin w/ codeine. She feels slightly better, but still not well. She is using albuterol q4h w/ relief. She has h/o wheezing previously and has refused PFTs in the past, so likely has some underlying lung disease. Review of Systems - ENT ROS: positive for - hearing change, nasal congestion and vocal changes Respiratory ROS: positive for - cough and wheezing Cardiovascular ROS: no chest pain or dyspnea on exertion Gastrointestinal ROS: no abdominal pain, change in bowel habits, or black or bloody stools Musculoskeletal ROS: negative Neurological ROS: negative Physical Examination:  
Visit Vitals /70 (BP 1 Location: Left arm, BP Patient Position: Sitting) Pulse 78 Temp 98.3 °F (36.8 °C) (Oral) Resp 20 Ht 5' 2\" (1.575 m) Wt 226 lb (102.5 kg) SpO2 95% BMI 41.34 kg/m² Constitutional: Well developed, nourished, no distress, alert HENT: Exterior ears and tympanic membranes normal bilaterally.  Supple neck. No thyromegaly or lymphadenopathy. Oropharynx clear and moist mucous membranes. +mild middle ear effusion Eyes: Conjunctiva normal. PERRL. CV: S1, S2.  RRR. No murmurs/rubs. No thrills palpated. No carotid bruits. Intact distal pulses. No edema. Pulm: No abnormalities on inspection. Clear to auscultation bilaterally. No wheezing/rhonchi. Normal effort.     
 
 
 
 
Eliot Lynch MD

## 2018-12-05 ENCOUNTER — OFFICE VISIT (OUTPATIENT)
Dept: FAMILY MEDICINE CLINIC | Age: 78
End: 2018-12-05

## 2018-12-05 VITALS
BODY MASS INDEX: 41.41 KG/M2 | SYSTOLIC BLOOD PRESSURE: 142 MMHG | DIASTOLIC BLOOD PRESSURE: 80 MMHG | RESPIRATION RATE: 20 BRPM | WEIGHT: 225 LBS | HEIGHT: 62 IN | HEART RATE: 81 BPM | TEMPERATURE: 98.3 F | OXYGEN SATURATION: 97 %

## 2018-12-05 DIAGNOSIS — Z00.00 MEDICARE ANNUAL WELLNESS VISIT, SUBSEQUENT: ICD-10-CM

## 2018-12-05 DIAGNOSIS — R05.9 COUGH: ICD-10-CM

## 2018-12-05 DIAGNOSIS — E11.3291 TYPE 2 DIABETES MELLITUS WITH RIGHT EYE AFFECTED BY MILD NONPROLIFERATIVE RETINOPATHY WITHOUT MACULAR EDEMA, WITHOUT LONG-TERM CURRENT USE OF INSULIN (HCC): Primary | ICD-10-CM

## 2018-12-05 LAB — HBA1C MFR BLD HPLC: 6.8 %

## 2018-12-05 RX ORDER — GUAIFENESIN 600 MG/1
600 TABLET, EXTENDED RELEASE ORAL 2 TIMES DAILY
COMMUNITY
End: 2019-01-28 | Stop reason: ALTCHOICE

## 2018-12-05 RX ORDER — CODEINE PHOSPHATE AND GUAIFENESIN 10; 100 MG/5ML; MG/5ML
5 SOLUTION ORAL
Qty: 180 ML | Refills: 0 | Status: SHIPPED | OUTPATIENT
Start: 2018-12-05 | End: 2019-01-28

## 2018-12-05 RX ORDER — AMOXICILLIN AND CLAVULANATE POTASSIUM 500; 125 MG/1; MG/1
1 TABLET, FILM COATED ORAL 2 TIMES DAILY
Qty: 14 TAB | Refills: 0 | Status: SHIPPED | OUTPATIENT
Start: 2018-12-05 | End: 2018-12-12

## 2018-12-05 NOTE — PROGRESS NOTES
Sheryn Seip is a [de-identified] y.o. female (: 1940) presenting to address: Chief Complaint Patient presents with Diane Slimmer Annual Wellness Visit Vitals:  
 18 1012 BP: 142/80 Pulse: 81 Resp: 20 Temp: 98.3 °F (36.8 °C) TempSrc: Oral  
SpO2: 97% Weight: 225 lb (102.1 kg) Height: 5' 2\" (1.575 m) PainSc:   9 PainLoc: Throat Hearing/Vision:  
 
 Visual Acuity Screening Right eye Left eye Both eyes Without correction:     
With correction: 20/40 20/00 20/40 Learning Assessment:  
 
Learning Assessment 2018 PRIMARY LEARNER Patient BARRIERS PRIMARY LEARNER NONE  
CO-LEARNER CAREGIVER No  
PRIMARY LANGUAGE ENGLISH  NEED No  
LEARNER PREFERENCE PRIMARY PICTURES  
LEARNING SPECIAL TOPICS none ANSWERED BY patient RELATIONSHIP SELF  
ASSESSMENT COMMENT na  
 
Depression Screening: PHQ over the last two weeks 2018 PHQ Not Done (No Data) Little interest or pleasure in doing things - Feeling down, depressed, irritable, or hopeless - Total Score PHQ 2 - Trouble falling or staying asleep, or sleeping too much - Feeling tired or having little energy - Poor appetite, weight loss, or overeating - Feeling bad about yourself - or that you are a failure or have let yourself or your family down - Trouble concentrating on things such as school, work, reading, or watching TV - Moving or speaking so slowly that other people could have noticed; or the opposite being so fidgety that others notice - Thoughts of being better off dead, or hurting yourself in some way -  
PHQ 9 Score - How difficult have these problems made it for you to do your work, take care of your home and get along with others - Fall Risk Assessment:  
 
Fall Risk Assessment, last 12 mths 2018 Able to walk? Yes Fall in past 12 months? No  
Fall with injury? -  
Number of falls in past 12 months - Fall Risk Score -  
 
Abuse Screening: Abuse Screening Questionnaire 12/4/2017 Do you ever feel afraid of your partner? Raj Distance Are you in a relationship with someone who physically or mentally threatens you? Raj Distance Is it safe for you to go home? Abdi Monreal Coordination of Care Questionaire: 1. Have you been to the ER, urgent care clinic since your last visit? Hospitalized since your last visit? NO 
 
2. Have you seen or consulted any other health care providers outside of the [address] [place] [address] since your last visit? Include any pap smears or colon screening. NO Advanced Directive: 1. Do you have an Advanced Directive? YES 
 
2. Would you like information on Advanced Directives?  NO

## 2018-12-05 NOTE — PATIENT INSTRUCTIONS
Medicare Wellness Visit, Female The best way to live healthy is to have a lifestyle where you eat a well-balanced diet, exercise regularly, limit alcohol use, and quit all forms of tobacco/nicotine, if applicable. Regular preventive services are another way to keep healthy. Preventive services (vaccines, screening tests, monitoring & exams) can help personalize your care plan, which helps you manage your own care. Screening tests can find health problems at the earliest stages, when they are easiest to treat. [facility] follows the current, evidence-based guidelines published by the [facility] ([facility]) when recommending preventive services for our patients. Because we follow these guidelines, sometimes recommendations change over time as research supports it. (For example, mammograms used to be recommended annually. Even though Medicare will still pay for an annual mammogram, the newer guidelines recommend a mammogram every two years for women of average risk.) Of course, you and your doctor may decide to screen more often for some diseases, based on your risk and your health status. Preventive services for you include: - Medicare offers their members a free annual wellness visit, which is time for you and your primary care provider to discuss and plan for your preventive service needs. Take advantage of this benefit every year! 
-All adults over the age of 72 should receive the recommended pneumonia vaccines. Current USPSTF guidelines recommend a series of two vaccines for the best pneumonia protection.  
-All adults should have a flu vaccine yearly and a tetanus vaccine every 10 years. All adults age 61 and older should receive a shingles vaccine once in their lifetime.   
-A bone mass density test is recommended when a woman turns 65 to screen for osteoporosis. This test is only recommended one time, as a screening. Some providers will use this same test as a disease monitoring tool if you already have osteoporosis. -All adults age 38-68 who are overweight should have a diabetes screening test once every three years.  
-Other screening tests and preventive services for persons with diabetes include: an eye exam to screen for diabetic retinopathy, a kidney function test, a foot exam, and stricter control over your cholesterol.  
-Cardiovascular screening for adults with routine risk involves an electrocardiogram (ECG) at intervals determined by your doctor.  
-Colorectal cancer screenings should be done for adults age 54-65 with no increased risk factors for colorectal cancer. There are a number of acceptable methods of screening for this type of cancer. Each test has its own benefits and drawbacks. Discuss with your doctor what is most appropriate for you during your annual wellness visit. The different tests include: colonoscopy (considered the best screening method), a fecal occult blood test, a fecal DNA test, and sigmoidoscopy. -Breast cancer screenings are recommended every other year for women of normal risk, age 54-69. 
-Cervical cancer screenings for women over age 72 are only recommended with certain risk factors.  
-All adults born between [facility] should be screened once for Hepatitis C. Here is a list of your current Health Maintenance items (your personalized list of preventive services) with a due date: 
Health Maintenance Due Topic Date Due  Shingles Vaccine (1 of 2) 04/21/1988  Flu Vaccine  08/01/2018  Pneumococcal Vaccine (2 of 2 - PPSV23) 12/04/2018 [facility] Annual Well Visit  12/05/2018 Advance Directives: Care Instructions Your Care Instructions An advance directive is a legal way to state your wishes at the end of your life. It tells your family and your doctor what to do if you can no longer say what you want. There are two main types of advance directives. You can change them any time that your wishes change. · A living will tells your family and your doctor your wishes about life support and other treatment. · A durable power of  for health care lets you name a person to make treatment decisions for you when you can't speak for yourself. This person is called a health care agent. If you do not have an advance directive, decisions about your medical care may be made by a doctor or a  who doesn't know you. It may help to think of an advance directive as a gift to the people who care for you. If you have one, they won't have to make tough decisions by themselves. Follow-up care is a key part of your treatment and safety. Be sure to make and go to all appointments, and call your doctor if you are having problems. It's also a good idea to know your test results and keep a list of the medicines you take. How can you care for yourself at home? · Discuss your wishes with your loved ones and your doctor. This way, there are no surprises. · Many states have a unique form. Or you might use a universal form that has been approved by many states. This kind of form can sometimes be completed and stored online. Your electronic copy will then be available wherever you have a connection to the Internet. In most cases, doctors will respect your wishes even if you have a form from a different state. · You don't need a  to do an advance directive. But you may want to get legal advice. · Think about these questions when you prepare an advance directive: 
? Who do you want to make decisions about your medical care if you are not able to? Many people choose a family member or close friend. ? Do you know enough about life support methods that might be used? If not, talk to your doctor so you understand. ? What are you most afraid of that might happen?  You might be afraid of having pain, losing your independence, or being kept alive by machines. ? Where would you prefer to die? Choices include your home, a hospital, or a nursing home. ? Would you like to have information about hospice care to support you and your family? ? Do you want to donate organs when you die? ? Do you want certain Zoroastrian practices performed before you die? If so, put your wishes in the advance directive. · Read your advance directive every year, and make changes as needed. When should you call for help? Be sure to contact your doctor if you have any questions. Where can you learn more? Go to http://bonita-loreta.info/. Enter R264 in the search box to learn more about \"Advance Directives: Care Instructions. \" Current as of: April 19, 2018 Content Version: 11.8 © 0162-8870 [employer]. Care instructions adapted under license by [employer] (which disclaims liability or warranty for this information). If you have questions about a medical condition or this instruction, always ask your healthcare professional. [address] any warranty or liability for your use of this information.

## 2018-12-05 NOTE — ACP (ADVANCE CARE PLANNING)
Advance Care Planning (ACP) Provider Conversation Snapshot    Date of ACP Conversation: 12/05/18  Persons included in Conversation:  patient  Length of ACP Conversation in minutes:  <16 minutes (Non-Billable)    Authorized Decision Maker (if patient is incapable of making informed decisions):    This person is:             For Patients with Decision Making Capacity:   full code    Conversation Outcomes / Follow-Up Plan:   Recommended completion of Advance Directive form after review of ACP materials and conversation with prospective healthcare agent

## 2018-12-10 ENCOUNTER — TELEPHONE (OUTPATIENT)
Dept: FAMILY MEDICINE CLINIC | Age: 78
End: 2018-12-10

## 2018-12-10 RX ORDER — FLUCONAZOLE 150 MG/1
150 TABLET ORAL DAILY
Qty: 1 TAB | Refills: 0 | Status: SHIPPED | OUTPATIENT
Start: 2018-12-10 | End: 2018-12-11

## 2018-12-10 NOTE — TELEPHONE ENCOUNTER
[facility] pharmacy also called about this interaction. Pt is aware. [facility] will fill.

## 2018-12-10 NOTE — TELEPHONE ENCOUNTER
Patient states that she has developed a yeast infection from the medication she has been taking and is requesting to have something called in for the yeast infection.

## 2018-12-17 ENCOUNTER — CLINICAL SUPPORT (OUTPATIENT)
Dept: FAMILY MEDICINE CLINIC | Age: 78
End: 2018-12-17

## 2018-12-17 DIAGNOSIS — I48.20 CHRONIC ATRIAL FIBRILLATION (HCC): Primary | ICD-10-CM

## 2018-12-17 LAB
INR BLD: 1.9
PT POC: 23.3 SECONDS
VALID INTERNAL CONTROL?: YES

## 2018-12-17 NOTE — PROGRESS NOTES
Aimee Figueroa is a [de-identified] y.o. female who presents today for Anticoagulation monitoring. Indication: Atrial Fibrillation  INR Goal: 2.0-3.0. Current dose:  Coumadin 7.5mg daily. Missed Coumadin Doses:  None  Medication Changes:  Yes - Diflucan. Dietary Changes:  no    Symptoms: taking coumadin appropriately without any bleeding. Latest INRs:  Lab Results   Component Value Date/Time    INR POC 1.9 12/17/2018 03:24 PM    INR POC 2.4 11/27/2018 09:38 AM    INR POC 2.5 11/07/2018 12:29 PM        New Coumadin dose:.current treatment plan is effective, no change in therapy, the following changes are made - Per Dr. Breana Aguirre take 1.5 tablets of Coumadin 7.5mg for today only and resume normal dosage of Coumadin 7.5mg daily. Next check to be scheduled for 1 weeks per Dr. Breana Aguirre.

## 2019-01-10 ENCOUNTER — OFFICE VISIT (OUTPATIENT)
Dept: FAMILY MEDICINE CLINIC | Age: 79
End: 2019-01-10

## 2019-01-10 ENCOUNTER — TELEPHONE (OUTPATIENT)
Dept: FAMILY MEDICINE CLINIC | Age: 79
End: 2019-01-10

## 2019-01-10 VITALS
HEART RATE: 84 BPM | WEIGHT: 227.6 LBS | OXYGEN SATURATION: 95 % | RESPIRATION RATE: 20 BRPM | BODY MASS INDEX: 41.88 KG/M2 | TEMPERATURE: 97.8 F | SYSTOLIC BLOOD PRESSURE: 160 MMHG | DIASTOLIC BLOOD PRESSURE: 80 MMHG | HEIGHT: 62 IN

## 2019-01-10 DIAGNOSIS — I48.20 CHRONIC ATRIAL FIBRILLATION (HCC): ICD-10-CM

## 2019-01-10 DIAGNOSIS — J01.00 ACUTE NON-RECURRENT MAXILLARY SINUSITIS: Primary | ICD-10-CM

## 2019-01-10 DIAGNOSIS — I10 ESSENTIAL HYPERTENSION: ICD-10-CM

## 2019-01-10 RX ORDER — FLUTICASONE PROPIONATE 50 MCG
2 SPRAY, SUSPENSION (ML) NASAL DAILY
Qty: 1 BOTTLE | Refills: 0 | Status: SHIPPED | OUTPATIENT
Start: 2019-01-10 | End: 2019-03-12 | Stop reason: SDUPTHER

## 2019-01-10 RX ORDER — HYDROCHLOROTHIAZIDE 12.5 MG/1
12.5 TABLET ORAL
Qty: 30 TAB | Refills: 0 | Status: SHIPPED | OUTPATIENT
Start: 2019-01-10 | End: 2019-02-04 | Stop reason: SDUPTHER

## 2019-01-10 RX ORDER — AMOXICILLIN AND CLAVULANATE POTASSIUM 875; 125 MG/1; MG/1
1 TABLET, FILM COATED ORAL 2 TIMES DAILY
Qty: 20 TAB | Refills: 0 | Status: SHIPPED | OUTPATIENT
Start: 2019-01-10 | End: 2019-01-10 | Stop reason: RX

## 2019-01-10 RX ORDER — CEFUROXIME AXETIL 500 MG/1
500 TABLET ORAL 2 TIMES DAILY
Qty: 20 TAB | Refills: 0 | Status: SHIPPED | OUTPATIENT
Start: 2019-01-10 | End: 2019-01-28

## 2019-01-10 NOTE — PROGRESS NOTES
Marilyn Pyror is a [de-identified] y.o. female (: 1940) presenting to address: Chief Complaint Patient presents with  
 Headache  
  times four days Vitals:  
 01/10/19 1026 BP: 160/80 Pulse: 84 Resp: 20 Temp: 97.8 °F (36.6 °C) TempSrc: Oral  
SpO2: 95% Weight: 227 lb 9.6 oz (103.2 kg) Height: 5' 2\" (1.575 m) PainSc:   8 PainLoc: Head Hearing/Vision: No exam data present Learning Assessment:  
 
Learning Assessment 2018 PRIMARY LEARNER Patient BARRIERS PRIMARY LEARNER NONE  
CO-LEARNER CAREGIVER No  
PRIMARY LANGUAGE ENGLISH  NEED No  
LEARNER PREFERENCE PRIMARY PICTURES  
LEARNING SPECIAL TOPICS none ANSWERED BY patient RELATIONSHIP SELF  
ASSESSMENT COMMENT na  
 
Depression Screening: PHQ over the last two weeks 2018 PHQ Not Done Active Diagnosis of Depression or Bipolar Disorder Little interest or pleasure in doing things - Feeling down, depressed, irritable, or hopeless - Total Score PHQ 2 - Trouble falling or staying asleep, or sleeping too much - Feeling tired or having little energy - Poor appetite, weight loss, or overeating - Feeling bad about yourself - or that you are a failure or have let yourself or your family down - Trouble concentrating on things such as school, work, reading, or watching TV - Moving or speaking so slowly that other people could have noticed; or the opposite being so fidgety that others notice - Thoughts of being better off dead, or hurting yourself in some way -  
PHQ 9 Score - How difficult have these problems made it for you to do your work, take care of your home and get along with others - Fall Risk Assessment:  
 
Fall Risk Assessment, last 12 mths 1/10/2019 Able to walk? Yes Fall in past 12 months? No  
Fall with injury? -  
Number of falls in past 12 months - Fall Risk Score -  
 
Abuse Screening:  
 
Abuse Screening Questionnaire 2017 Do you ever feel afraid of your partner? Fany Mcfarlane Are you in a relationship with someone who physically or mentally threatens you? Fany Mcfarlane Is it safe for you to go home? Nelson Goddard Coordination of Care Questionaire: 1. Have you been to the ER, urgent care clinic since your last visit? Hospitalized since your last visit? [facility]18 2. Have you seen or consulted any other health care providers outside of the [address] [place] [address] since your last visit? Include any pap smears or colon screening. NO Advanced Directive: 1. Do you have an Advanced Directive? NO 
 
2. Would you like information on Advanced Directives?  NOp

## 2019-01-10 NOTE — TELEPHONE ENCOUNTER
Pharmacy sent a fax to inform us that the Augmentin strength that Dr Sandi Dinero had sent in was on Backorder. I called per Dr Sandi Dinero' request to see if they had Ceftin in stock. They did so a new prescription was sent by MD.

## 2019-01-14 ENCOUNTER — CLINICAL SUPPORT (OUTPATIENT)
Dept: FAMILY MEDICINE CLINIC | Age: 79
End: 2019-01-14

## 2019-01-14 VITALS
RESPIRATION RATE: 20 BRPM | OXYGEN SATURATION: 94 % | TEMPERATURE: 98.6 F | WEIGHT: 224 LBS | SYSTOLIC BLOOD PRESSURE: 135 MMHG | DIASTOLIC BLOOD PRESSURE: 60 MMHG | HEART RATE: 84 BPM | BODY MASS INDEX: 41.22 KG/M2 | HEIGHT: 62 IN

## 2019-01-14 DIAGNOSIS — I48.20 CHRONIC ATRIAL FIBRILLATION (HCC): Primary | ICD-10-CM

## 2019-01-14 DIAGNOSIS — Z23 ENCOUNTER FOR IMMUNIZATION: ICD-10-CM

## 2019-01-14 LAB
INR BLD: 1.8
PT POC: 22 SECONDS
VALID INTERNAL CONTROL?: YES

## 2019-01-14 NOTE — PROGRESS NOTES
Immunization/s administered 1/14/2019 by Antonio Rivera LPN with guardian's consent. Patient tolerated procedure well. No reactions noted. Fluad, left deltoid.

## 2019-01-14 NOTE — PROGRESS NOTES
Tye Ryan is a [de-identified] y.o. female who presents today for Anticoagulation monitoring. Indication: Atrial Fibrillation  INR Goal: 2.5-3.5. Current dose:  Coumadin 7.5 mg daily. Missed Coumadin Doses:  None  Medication Changes:  no  Dietary Changes:  no    Symptoms: taking coumadin appropriately without any bleeding. Latest INRs:  Lab Results   Component Value Date/Time    INR POC 1.8 01/14/2019 01:40 PM    INR POC 1.9 12/17/2018 03:24 PM    INR POC 2.4 11/27/2018 09:38 AM        New Coumadin dose:.current treatment plan, no change in therapy per Dr. Brooke Car. Next check to be scheduled for  1 weeks.

## 2019-01-16 ENCOUNTER — TELEPHONE (OUTPATIENT)
Dept: FAMILY MEDICINE CLINIC | Age: 79
End: 2019-01-16

## 2019-01-16 NOTE — TELEPHONE ENCOUNTER
Pt said she was seen by Dr. Margy Bocanegra on 01/10/2019 and was prescribed an antibiotic and she says she now has a yeast infection. She was wondering if she could be prescribed something for it. I told her that she might need to be seen first before anything could be prescribed.

## 2019-01-17 DIAGNOSIS — K59.00 CONSTIPATION, UNSPECIFIED CONSTIPATION TYPE: ICD-10-CM

## 2019-01-17 RX ORDER — FLUCONAZOLE 150 MG/1
150 TABLET ORAL DAILY
Qty: 1 TAB | Refills: 0 | Status: SHIPPED | OUTPATIENT
Start: 2019-01-17 | End: 2019-01-18

## 2019-01-17 RX ORDER — POLYETHYLENE GLYCOL 3350 17 G/17G
17 POWDER, FOR SOLUTION ORAL DAILY
Qty: 765 G | Refills: 1 | Status: SHIPPED | OUTPATIENT
Start: 2019-01-17

## 2019-01-21 ENCOUNTER — CLINICAL SUPPORT (OUTPATIENT)
Dept: FAMILY MEDICINE CLINIC | Age: 79
End: 2019-01-21

## 2019-01-21 VITALS
HEIGHT: 62 IN | BODY MASS INDEX: 40.85 KG/M2 | WEIGHT: 222 LBS | DIASTOLIC BLOOD PRESSURE: 80 MMHG | OXYGEN SATURATION: 95 % | RESPIRATION RATE: 18 BRPM | HEART RATE: 63 BPM | SYSTOLIC BLOOD PRESSURE: 100 MMHG | TEMPERATURE: 98.3 F

## 2019-01-21 DIAGNOSIS — I48.20 CHRONIC ATRIAL FIBRILLATION (HCC): Primary | ICD-10-CM

## 2019-01-21 LAB
INR BLD: 1.9
PT POC: 23.4 SECONDS
VALID INTERNAL CONTROL?: YES

## 2019-01-21 RX ORDER — WARFARIN 1 MG/1
TABLET ORAL
Qty: 90 TAB | Refills: 0 | Status: SHIPPED | OUTPATIENT
Start: 2019-01-21 | End: 2019-01-28 | Stop reason: DRUGHIGH

## 2019-01-21 RX ORDER — WARFARIN SODIUM 5 MG/1
TABLET ORAL
Qty: 30 TAB | Refills: 0 | Status: SHIPPED | OUTPATIENT
Start: 2019-01-21 | End: 2019-01-28 | Stop reason: DRUGHIGH

## 2019-01-21 RX ORDER — TRIAMCINOLONE ACETONIDE 1 MG/G
OINTMENT TOPICAL 2 TIMES DAILY
Qty: 30 G | Refills: 0 | Status: SHIPPED | OUTPATIENT
Start: 2019-01-21

## 2019-01-21 NOTE — TELEPHONE ENCOUNTER
Completely out.     Last OV : today (INR)  Last refilled: 7/17/17   Next appointment:  Next week for INR

## 2019-01-28 ENCOUNTER — OFFICE VISIT (OUTPATIENT)
Dept: FAMILY MEDICINE CLINIC | Age: 79
End: 2019-01-28

## 2019-01-28 ENCOUNTER — TELEPHONE (OUTPATIENT)
Dept: FAMILY MEDICINE CLINIC | Age: 79
End: 2019-01-28

## 2019-01-28 VITALS
SYSTOLIC BLOOD PRESSURE: 120 MMHG | HEIGHT: 62 IN | RESPIRATION RATE: 18 BRPM | WEIGHT: 222 LBS | BODY MASS INDEX: 40.85 KG/M2 | OXYGEN SATURATION: 96 % | HEART RATE: 70 BPM | DIASTOLIC BLOOD PRESSURE: 70 MMHG | TEMPERATURE: 99.3 F

## 2019-01-28 DIAGNOSIS — M54.50 CHRONIC BILATERAL LOW BACK PAIN WITHOUT SCIATICA: ICD-10-CM

## 2019-01-28 DIAGNOSIS — G89.29 CHRONIC BILATERAL LOW BACK PAIN WITHOUT SCIATICA: ICD-10-CM

## 2019-01-28 DIAGNOSIS — I48.20 CHRONIC ATRIAL FIBRILLATION (HCC): Primary | ICD-10-CM

## 2019-01-28 LAB
INR BLD: 2
PT POC: 23.4 SECONDS
VALID INTERNAL CONTROL?: YES

## 2019-01-28 RX ORDER — WARFARIN 6 MG/1
TABLET ORAL
Qty: 90 TAB | Refills: 0 | Status: SHIPPED | OUTPATIENT
Start: 2019-01-28

## 2019-01-28 RX ORDER — WARFARIN 2 MG/1
TABLET ORAL
Qty: 90 TAB | Refills: 0 | Status: SHIPPED | OUTPATIENT
Start: 2019-01-28

## 2019-01-28 RX ORDER — DICLOFENAC SODIUM 10 MG/G
GEL TOPICAL 4 TIMES DAILY
Qty: 100 G | Refills: 1 | Status: SHIPPED | OUTPATIENT
Start: 2019-01-28 | End: 2019-02-19 | Stop reason: ALTCHOICE

## 2019-01-28 NOTE — TELEPHONE ENCOUNTER
Pt would like to speak with Dr. Rao Rather nurse. She realized after she got home from her visit today that it said to stop taking her medicated eye drops. She said those were prescribed by her eye dr and wants to know why she needs to stop taking them.

## 2019-01-28 NOTE — PROGRESS NOTES
Assessment/Plan:    1. Chronic atrial fibrillation (HCC)  -cont 8mg. Repeat INR in 2 weeks. - AMB POC PT/INR  - warfarin (COUMADIN) 6 mg tablet; Take with 2mg for total of 8mg. Dispense: 90 Tab; Refill: 0  - warfarin (COUMADIN) 2 mg tablet; Take with 6mg for total of 8mg daily  Dispense: 90 Tab; Refill: 0    2. Chronic bilateral low back pain without sciatica  -trial voltaren. She is not a candidate for nsaids due to anticoagulation and risk of bleeding. Xray. Declines PT and pain management b/c she can't afford copays. She is requesting norco daily use. Trial otc tens unit. - diclofenac (VOLTAREN) 1 % gel; Apply  to affected area four (4) times daily. Dispense: 100 g; Refill: 1  - XR SPINE LUMB COMP W BEND; Future      The plan was discussed with the patient. The patient verbalized understanding and is in agreement with the plan. All medication potential side effects were discussed with the patient. Health Maintenance:   Health Maintenance   Topic Date Due    Shingrix Vaccine Age 49> (1 of 2) 04/21/1988    Pneumococcal 65+ Low/Medium Risk (2 of 2 - PPSV23) 12/04/2018    HEMOGLOBIN A1C Q6M  06/05/2019    FOOT EXAM Q1  07/12/2019    MICROALBUMIN Q1  07/12/2019    LIPID PANEL Q1  07/12/2019    MEDICARE YEARLY EXAM  12/06/2019    GLAUCOMA SCREENING Q2Y  09/18/2020    EYE EXAM RETINAL OR DILATED  09/18/2020    DTaP/Tdap/Td series (2 - Td) 09/06/2027    Bone Densitometry (Dexa) Screening  Completed    Influenza Age 5 to Adult  Completed       Otoniel Calderon is a [de-identified] y.o. female and presents with Sinus Infection (Acute maxillary sinusitis) and Anticoagulation     Subjective:  She notes chronic thoracolumbar back pain that is non-radiating. States she can barely walk. Has tried lidocaine and salon pas. Standing causes pain. No pain at rest -laying or sitting. Has previously done PT.   Has seen an orthopedic previously and was told it was non-surgical.   She states she can't afford to go see ortho spine. We had referred her to pain management last year, but states she couldn't go to the appt b/c they required imaging that she can't afford the copays on. Anticoag - dose increased to 8mg last week. iNR is 2.0mg. She had been seeing pain management in [place], [place] - on hydrocodone only prn. She feels tramadol is ineffective. She can't take nsaids b/c of anticoagulation    ROS:  Constitutional: No recent weight change. No weakness/fatigue. No f/c. Cardiovascular: No CP/palpitations. No CHO/orthopnea/PND. Respiratory: No cough/sputum, dyspnea, wheezing. Gastointestinal: No dysphagia, reflux. No n/v. No constipation/diarrhea. No melena/rectal bleeding. Genitourinary: No dysuria, urinary hesitancy, nocturia, hematuria. No incontinence. Musculoskeletal: No joint pain/stiffness. No muscle pain/tenderness. Neurological: No seizures/numbness/weakness. No paresthesias. The problem list was updated as a part of today's visit. Patient Active Problem List   Diagnosis Code    Osteopenia of multiple sites M85.89    ANA CRISTINA (obstructive sleep apnea) G47.33    A-fib ([facility]) I48.91    Right bundle branch block (RBBB) I45.10    Type 2 diabetes mellitus with right eye affected by mild nonproliferative retinopathy without macular edema, without long-term current use of insulin ([facility] [address] S97.7767    Anticoagulation monitoring, INR range 2-3 Z79.01    Wheezing R06.2    Depression with anxiety F41.8    Severe obesity (BMI >= 40) ([facility]) E66.01    Essential hypertension I10    DDD (degenerative disc disease), thoracolumbar M51.35   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     SH:  Social History     Tobacco Use    Smoking status: Never Smoker    Smokeless tobacco: Never Used   Substance Use Topics    Alcohol use: No    Drug use: No       Medications/Allergies:  Current Outpatient Medications on File Prior to Visit   Medication Sig Dispense Refill    triamcinolone acetonide (KENALOG) 0.1 % ointment Apply  to affected area two (2) times a day. use thin layer 30 g 0    polyethylene glycol (MIRALAX) 17 gram/dose powder Take 17 g by mouth daily. 765 g 1    fluticasone (FLONASE) 50 mcg/actuation nasal spray 2 Sprays by Both Nostrils route daily. 1 Bottle 0    hydroCHLOROthiazide (HYDRODIURIL) 12.5 mg tablet Take 1 Tab by mouth every morning. 30 Tab 0    omeprazole (PRILOSEC) 20 mg capsule TAKE 1 CAPSULE BY MOUTH ONCE DAILY 90 Cap 1    fluticasone (FLOVENT HFA) 110 mcg/actuation inhaler Take 2 Puffs by inhalation every twelve (12) hours. 1 Inhaler 0    metoprolol tartrate (LOPRESSOR) 50 mg tablet Take 1 Tab by mouth two (2) times a day. 180 Tab 1    dilTIAZem (TIAZAC) 360 mg ER capsule Take 1 Cap by mouth daily. 90 Cap 1    losartan (COZAAR) 50 mg tablet TAKE 1 TABLET BY MOUTH TWICE DAILY 180 Tab 1    sertraline (ZOLOFT) 50 mg tablet Take 1 Tab by mouth daily. 90 Tab 1    traZODone (DESYREL) 50 mg tablet Take 1 Tab by mouth nightly as needed for Sleep. 90 Tab 1    dicyclomine (BENTYL) 10 mg capsule Take 1 Cap by mouth three (3) times daily as needed. 90 Cap 0     No current facility-administered medications on file prior to visit. Allergies   Allergen Reactions    Sulfa (Sulfonamide Antibiotics) Photosensitivity    Wellbutrin [Bupropion Hcl] Itching       Objective:  Visit Vitals  /70 (BP 1 Location: Left arm, BP Patient Position: Sitting)   Pulse 70   Temp 99.3 °F (37.4 °C) (Oral)   Resp 18   Ht 5' 2\" (1.575 m)   Wt 222 lb (100.7 kg)   LMP  (LMP Unknown)   SpO2 96%   BMI 40.60 kg/m²      Constitutional: Well developed, nourished, no distress, alert, obese habitus   CV: S1, S2.  RRR. No murmurs/rubs. No thrills palpated. No carotid bruits. Intact distal pulses. No edema. No aortic bruits. Pulm: No abnormalities on inspection.   Clear to auscultation bilaterally. No wheezing/rhonchi. Normal effort.      MS: Gait normal.  +pain over bilat lumbar parsapinal region

## 2019-01-29 NOTE — TELEPHONE ENCOUNTER
Returned pt call. AVS said to stop eye drops. I told her that we usually only d/c home meds if we are told they are not taking them. She says she is and has to take the eye drops for glaucoma. Is there any reason she needs to stop these?

## 2019-01-30 RX ORDER — LATANOPROST 50 UG/ML
1 SOLUTION/ DROPS OPHTHALMIC
COMMUNITY
Start: 2019-01-30

## 2019-02-04 ENCOUNTER — TELEPHONE (OUTPATIENT)
Dept: FAMILY MEDICINE CLINIC | Age: 79
End: 2019-02-04

## 2019-02-04 NOTE — TELEPHONE ENCOUNTER
Pt called stating she has tried the voltaren gel and otc tens unit and they don't help much. She said the gels works topically for just a little while but it doesn't last. Please advise.

## 2019-02-11 ENCOUNTER — CLINICAL SUPPORT (OUTPATIENT)
Dept: FAMILY MEDICINE CLINIC | Age: 79
End: 2019-02-11

## 2019-02-11 DIAGNOSIS — I48.20 CHRONIC ATRIAL FIBRILLATION (HCC): Primary | ICD-10-CM

## 2019-02-11 LAB
INR BLD: 2
PT POC: 24.3 SECONDS
VALID INTERNAL CONTROL?: YES

## 2019-02-19 ENCOUNTER — OFFICE VISIT (OUTPATIENT)
Dept: FAMILY MEDICINE CLINIC | Age: 79
End: 2019-02-19

## 2019-02-19 VITALS
WEIGHT: 223 LBS | TEMPERATURE: 98.5 F | BODY MASS INDEX: 41.04 KG/M2 | HEIGHT: 62 IN | HEART RATE: 82 BPM | OXYGEN SATURATION: 95 % | SYSTOLIC BLOOD PRESSURE: 120 MMHG | DIASTOLIC BLOOD PRESSURE: 60 MMHG | RESPIRATION RATE: 20 BRPM

## 2019-02-19 DIAGNOSIS — M51.35 DDD (DEGENERATIVE DISC DISEASE), THORACOLUMBAR: Primary | ICD-10-CM

## 2019-02-19 DIAGNOSIS — Z79.899 HIGH RISK MEDICATION USE: ICD-10-CM

## 2019-02-19 RX ORDER — HYDROCODONE BITARTRATE AND ACETAMINOPHEN 5; 325 MG/1; MG/1
1 TABLET ORAL
Qty: 30 TAB | Refills: 0 | Status: SHIPPED | OUTPATIENT
Start: 2019-02-19 | End: 2019-02-19 | Stop reason: SDUPTHER

## 2019-02-19 RX ORDER — HYDROCODONE BITARTRATE AND ACETAMINOPHEN 5; 325 MG/1; MG/1
1 TABLET ORAL
Qty: 30 TAB | Refills: 0 | Status: SHIPPED | OUTPATIENT
Start: 2019-04-17

## 2019-02-19 RX ORDER — HYDROCODONE BITARTRATE AND ACETAMINOPHEN 5; 325 MG/1; MG/1
1 TABLET ORAL
Qty: 30 TAB | Refills: 0 | Status: SHIPPED | OUTPATIENT
Start: 2019-03-18 | End: 2019-02-19 | Stop reason: SDUPTHER

## 2019-02-19 NOTE — PROGRESS NOTES
Assessment/Plan:    1. DDD (degenerative disc disease), thoracolumbar  -contract signed. UDS obtained. 3 mos worth of scripts.   - HYDROcodone-acetaminophen (NORCO) 5-325 mg per tablet; Take 1 Tab by mouth daily as needed for Pain. Dispense: 30 Tab; Refill: 0    2. High risk medication use  - TOXASSURE SELECT 13 (MW); Future      The plan was discussed with the patient. The patient verbalized understanding and is in agreement with the plan. All medication potential side effects were discussed with the patient. Health Maintenance:   Health Maintenance   Topic Date Due    Shingrix Vaccine Age 49> (1 of 2) 04/21/1988    Pneumococcal 65+ Low/Medium Risk (2 of 2 - PPSV23) 12/04/2018    HEMOGLOBIN A1C Q6M  06/05/2019    FOOT EXAM Q1  07/12/2019    MICROALBUMIN Q1  07/12/2019    LIPID PANEL Q1  07/12/2019    MEDICARE YEARLY EXAM  12/06/2019    GLAUCOMA SCREENING Q2Y  09/18/2020    EYE EXAM RETINAL OR DILATED  09/18/2020    DTaP/Tdap/Td series (2 - Td) 09/06/2027    Bone Densitometry (Dexa) Screening  Completed    Influenza Age 5 to Adult  Completed     Ethan Numbers is a [de-identified] y.o. female and presents with Back Pain (X-Ray consult)     Subjective:  Pt with chronic back pain. Xray showed moderate DDD and scoliosis lumbar region. She is requesting narcotics for daily use. She is already on nsaids. Pain is non-radicular. She doesn't feel the voltaren gel has helped at all. She uses tens unit w/o relief. Pain is bilat, R>L. She rates pain 8/10. She has a small dog at home. She is in a new (lower-income) apartment and is requesting a letter stating the dog helps her anxiety. She feels her anxiety is well controlled. ROS:  Constitutional: No recent weight change. No weakness/fatigue. No f/c. Cardiovascular: No CP/palpitations. No CHO/orthopnea/PND. Respiratory: No cough/sputum, dyspnea, wheezing. Gastointestinal: No dysphagia, reflux. No n/v. No constipation/diarrhea.   No melena/rectal bleeding. The problem list was updated as a part of today's visit. Patient Active Problem List   Diagnosis Code    Osteopenia of multiple sites M85.89    ANA CRISTINA (obstructive sleep apnea) G47.33    A-fib ([facility]) I48.91    Right bundle branch block (RBBB) I45.10    Type 2 diabetes mellitus with right eye affected by mild nonproliferative retinopathy without macular edema, without long-term current use of insulin ([facility] [address] K55.2027    Anticoagulation monitoring, INR range 2-3 Z79.01    Wheezing R06.2    Depression with anxiety F41.8    Severe obesity (BMI >= 40) ([facility]) E66.01    Essential hypertension I10    DDD (degenerative disc disease), thoracolumbar M51.35   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 SH:  Social History     Tobacco Use    Smoking status: Never Smoker    Smokeless tobacco: Never Used   Substance Use Topics    Alcohol use: No    Drug use: No       Medications/Allergies:  Current Outpatient Medications on File Prior to Visit   Medication Sig Dispense Refill    hydroCHLOROthiazide (HYDRODIURIL) 12.5 mg tablet TAKE 1 TABLET BY MOUTH ONCE DAILY IN THE MORNING 90 Tab 1    latanoprost (XALATAN) 0.005 % ophthalmic solution Administer 1 Drop to both eyes nightly.  warfarin (COUMADIN) 6 mg tablet Take with 2mg for total of 8mg. 90 Tab 0    warfarin (COUMADIN) 2 mg tablet Take with 6mg for total of 8mg daily 90 Tab 0    diclofenac (VOLTAREN) 1 % gel Apply  to affected area four (4) times daily. 100 g 1    triamcinolone acetonide (KENALOG) 0.1 % ointment Apply  to affected area two (2) times a day. use thin layer 30 g 0    polyethylene glycol (MIRALAX) 17 gram/dose powder Take 17 g by mouth daily. 765 g 1    fluticasone (FLONASE) 50 mcg/actuation nasal spray 2 Sprays by Both Nostrils route daily.  1 Bottle 0    omeprazole (PRILOSEC) 20 mg capsule TAKE 1 CAPSULE BY MOUTH ONCE DAILY 90 Cap 1    fluticasone (FLOVENT HFA) 110 mcg/actuation inhaler Take 2 Puffs by inhalation every twelve (12) hours. 1 Inhaler 0    metoprolol tartrate (LOPRESSOR) 50 mg tablet Take 1 Tab by mouth two (2) times a day. 180 Tab 1    dilTIAZem (TIAZAC) 360 mg ER capsule Take 1 Cap by mouth daily. 90 Cap 1    losartan (COZAAR) 50 mg tablet TAKE 1 TABLET BY MOUTH TWICE DAILY 180 Tab 1    sertraline (ZOLOFT) 50 mg tablet Take 1 Tab by mouth daily. 90 Tab 1    traZODone (DESYREL) 50 mg tablet Take 1 Tab by mouth nightly as needed for Sleep. 90 Tab 1    dicyclomine (BENTYL) 10 mg capsule Take 1 Cap by mouth three (3) times daily as needed. 90 Cap 0     No current facility-administered medications on file prior to visit. Allergies   Allergen Reactions    Sulfa (Sulfonamide Antibiotics) Photosensitivity    Wellbutrin [Bupropion Hcl] Itching       Objective:  Visit Vitals  /60 (BP 1 Location: Left arm, BP Patient Position: Sitting)   Pulse 82   Temp 98.5 °F (36.9 °C) (Oral)   Resp 20   Ht 5' 2\" (1.575 m)   Wt 223 lb (101.2 kg)   LMP  (LMP Unknown)   SpO2 95%   BMI 40.79 kg/m²      Constitutional: Well developed, nourished, no distress, alert, obese habitus   CV: S1, S2.  RRR. No murmurs/rubs. No thrills palpated. No carotid bruits. Intact distal pulses. No edema. No aortic bruits. Pulm: No abnormalities on inspection. Clear to auscultation bilaterally. No wheezing/rhonchi. Normal effort. GI: Soft, nontender, nondistended. Normal active bowel sounds. Neuro: A/O x 3. No focal motor or sensory deficits.  Speech normal.     Labwork and Ancillary Studies:    CBC w/Diff  Lab Results   Component Value Date/Time    WBC 7.3 07/12/2018 11:58 AM    HGB 10.5 (L) 07/12/2018 11:58 AM    PLATELET 093 50/96/5676 11:58 AM         Basic Metabolic Profile/LFTs  Lab Results   Component Value Date/Time    Sodium 143 07/12/2018 11:58 AM    Potassium 4.9 07/12/2018 11:58 AM    Chloride 102 07/12/2018 11:58 AM    CO2 23 07/12/2018 11:58 AM    Glucose 127 (H) 07/12/2018 11:58 AM    BUN 11 07/12/2018 11:58 AM    Creatinine 0.72 07/12/2018 11:58 AM    BUN/Creatinine ratio 15 07/12/2018 11:58 AM    GFR est AA 91 07/12/2018 11:58 AM    GFR est non-AA 79 07/12/2018 11:58 AM    Calcium 10.1 07/12/2018 11:58 AM      Lab Results   Component Value Date/Time    ALT (SGPT) 24 07/12/2018 11:58 AM    AST (SGOT) 52 (H) 07/12/2018 11:58 AM    Alk.  phosphatase 141 (H) 07/12/2018 11:58 AM    Bilirubin, total 0.5 07/12/2018 11:58 AM       Cholesterol  Lab Results   Component Value Date/Time    Cholesterol, total 171 07/12/2018 11:58 AM    HDL Cholesterol 42 07/12/2018 11:58 AM    LDL, calculated 99 07/12/2018 11:58 AM    Triglyceride 149 07/12/2018 11:58 AM

## 2019-02-19 NOTE — PROGRESS NOTES
Nisa Das is a [de-identified] y.o. female (: 1940) presenting to address:    Chief Complaint   Patient presents with    Back Pain     X-Ray consult       Vitals:    19 1122   BP: 120/60   Pulse: 82   Resp: 20   Temp: 98.5 °F (36.9 °C)   TempSrc: Oral   SpO2: 95%   Weight: 223 lb (101.2 kg)   Height: 5' 2\" (1.575 m)   PainSc:   8   PainLoc: Back       Learning Assessment:     Learning Assessment 2018   PRIMARY LEARNER Patient   BARRIERS PRIMARY LEARNER NONE   CO-LEARNER CAREGIVER No   PRIMARY LANGUAGE ENGLISH    NEED No   LEARNER PREFERENCE PRIMARY PICTURES   LEARNING SPECIAL TOPICS none   ANSWERED BY patient   RELATIONSHIP SELF   ASSESSMENT COMMENT na     Depression Screening:     3 most recent PHQ Screens 2018   PHQ Not Done Active Diagnosis of Depression or Bipolar Disorder   Little interest or pleasure in doing things -   Feeling down, depressed, irritable, or hopeless -   Total Score PHQ 2 -   Trouble falling or staying asleep, or sleeping too much -   Feeling tired or having little energy -   Poor appetite, weight loss, or overeating -   Feeling bad about yourself - or that you are a failure or have let yourself or your family down -   Trouble concentrating on things such as school, work, reading, or watching TV -   Moving or speaking so slowly that other people could have noticed; or the opposite being so fidgety that others notice -   Thoughts of being better off dead, or hurting yourself in some way -   PHQ 9 Score -   How difficult have these problems made it for you to do your work, take care of your home and get along with others -     Fall Risk Assessment:     Fall Risk Assessment, last 12 mths 1/10/2019   Able to walk? Yes   Fall in past 12 months? No   Fall with injury? -   Number of falls in past 12 months -   Fall Risk Score -     Abuse Screening:     Abuse Screening Questionnaire 2017   Do you ever feel afraid of your partner?  N   Are you in a relationship with someone who physically or mentally threatens you? N   Is it safe for you to go home? Y     Coordination of Care Questionaire:   1. Have you been to the ER, urgent care clinic since your last visit? Hospitalized since your last visit? NO    2. Have you seen or consulted any other health care providers outside of the [address] [place] [address] since your last visit? Include any pap smears or colon screening. NO    Advanced Directive:   1. Do you have an Advanced Directive? YES    2. Would you like information on Advanced Directives?  NO

## 2019-02-19 NOTE — PATIENT INSTRUCTIONS
You are on a controlled substance. You will need a follow-up appointment for refills in 3 months (for pain medication or ADD medications) or 6 months (for all other controlled substances). Please make your follow-up appointment today, prior to running out of your medications. Controlled substance medications require a hard copy prescription. These cannot be faxed. -      Patient information: Narcotic pain medicines (The Basics)    What are narcotic pain medicines? -- Narcotic pain medicines are a group of medicines that relieve pain. Some of these medicines are made from a plant called the poppy plant. There are also many man-made (also called synthetic) versions of narcotic pain medicines. Doctors refer to narcotic pain medicines as opioids.  Here they will be called just narcotics.   Narcotics come in lots of different forms, including:   Pills and liquids that you swallow   Lozenges and sprays that go in your mouth   Patches that you wear on your skin   Liquids that are given as a shot or into a thin tube that goes into a vein, called an IV    When are narcotics used? -- Narcotics are used to treat severe pain caused by all sorts of medical problems and injuries. They are also used to manage pain after surgery. Some narcotic medicines can be used to treat a bad cough. Plus, narcotics can help control a symptom called Cloteal Dorothy in people who have serious breathing problems. Air hunger is when you feel like you cant get enough air. Are all narcotics the same? -- Yes and no. All narcotics work on the same chemical process in the body, but they do it in different ways. Some narcotics need to be taken more often during the day than others to work for certain kinds of pain. And some are more likely than others to cause certain side effects. Plus, the effects of narcotics are different depending on whether they come in a pill, a patch, a shot, or in some other way. Are narcotics safe for everyone? -- Narcotics are safe for most people who need them for severe pain. If you take these medicines, take ONLY the amount prescribed and only as often as prescribed. Do not chew, cut, or crush pills or capsules that release medicine slowly. People who had a severe reaction to 1 narcotic should not take the same narcotic again. People with certain health conditions must use narcotics with care or take smaller doses. That includes people who have breathing problems, or heart, kidney, or liver disease, and people who are older or weak. People who have a history of drug abuse must also use narcotics with care. They should avoid narcotics if possible. But if they need the medicines to treat severe pain, they should seek the help of a pain specialist.     What side effects can narcotics cause? -- Narcotics can cause some side effects that are just bothersome and some that are dangerous. Call 9-1-1 for an ambulance or go to the hospital if you (or someone close to you):  Cant seem to wake up   Become very confused   Appear to be drowsy and breathing very slowly   Pass out or have seizures   Become unable to urinate  Talk to your doctor or nurse if you have any of these side effects and they bother you:  Constipation - Your doctor or nurse might suggest you take medicines to prevent or treat constipation. Its also important to drink plenty of water. Nausea, vomiting, or itchiness - If you have any of these problems, your doctor might be able to switch you to a different narcotic. Dry mouth   Feel dizzy, sleepy, or have trouble thinking clearly   Vision problems   Feel clumsy or fall down  What happens if I take more than the recommended dose? -- Taking more than the recommended dose of a narcotic or combining narcotics with other medicines without a doctors OK can cause serious problems. For example, it can make you pass out or stop breathing.    Anybody who takes too much of any medicine at once should call a doctor or the [employer] ([phone]). If the person is not breathing or is not conscious, call 9-1-1 for an ambulance. Should I worry about addiction? -- Over the short-term, most people will not become addicted. However, long-term narcotics can lead to addiction. To reduce the chances of addiction, you should:  Never take narcotics that were not prescribed to you. Take narcotics only for as long as your doctor or nurse prescribes, and only at the dose he or she recommends. If the problem for which the narcotics were prescribed gets better, throw away any leftover narcotics. Do not keep old narcotics around the house. Tell your doctor or nurse if the narcotics seem to stop working. What if I want to get pregnant? -- If you take narcotics and want to get pregnant, talk to your doctor or nurse before you start trying to get pregnant. Narcotics taken during pregnancy can cause serious problems for the mother and the baby. There might be other ways to control your pain.

## 2019-02-19 NOTE — LETTER
2/19/2019 11:47 AM 
 
Ms. Lary Gleason [address] [address] The patient is under my care for chronic medical conditions. She has a small dog that helps control her anxiety and should be allowed to continue keeping the dog for this reason. If you have any questions, please call the office. Take care, 
Dr. Malika Carey

## 2019-02-20 ENCOUNTER — TELEPHONE (OUTPATIENT)
Dept: FAMILY MEDICINE CLINIC | Age: 79
End: 2019-02-20

## 2019-02-26 LAB — DRUGS UR: NORMAL

## 2019-03-05 NOTE — TELEPHONE ENCOUNTER
This was approved on 2/20/19, faxed this to [facility] in case they did not receive the covermymeds fax.

## 2019-03-12 ENCOUNTER — OFFICE VISIT (OUTPATIENT)
Dept: FAMILY MEDICINE CLINIC | Age: 79
End: 2019-03-12

## 2019-03-12 VITALS
WEIGHT: 223 LBS | TEMPERATURE: 98.6 F | SYSTOLIC BLOOD PRESSURE: 130 MMHG | OXYGEN SATURATION: 96 % | RESPIRATION RATE: 20 BRPM | HEIGHT: 62 IN | HEART RATE: 77 BPM | DIASTOLIC BLOOD PRESSURE: 68 MMHG | BODY MASS INDEX: 41.04 KG/M2

## 2019-03-12 DIAGNOSIS — M25.511 ACUTE PAIN OF RIGHT SHOULDER: Primary | ICD-10-CM

## 2019-03-12 DIAGNOSIS — J01.00 ACUTE NON-RECURRENT MAXILLARY SINUSITIS: ICD-10-CM

## 2019-03-12 DIAGNOSIS — M75.51 ACUTE BURSITIS OF RIGHT SHOULDER: ICD-10-CM

## 2019-03-12 DIAGNOSIS — I48.20 CHRONIC ATRIAL FIBRILLATION (HCC): ICD-10-CM
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
## 2019-03-12 DIAGNOSIS — R10.84 GENERALIZED ABDOMINAL PAIN: ICD-10-CM

## 2019-03-12 DIAGNOSIS — S00.83XA FACIAL HEMATOMA, INITIAL ENCOUNTER: ICD-10-CM

## 2019-03-12 DIAGNOSIS — F41.8 DEPRESSION WITH ANXIETY: ICD-10-CM

## 2019-03-12 LAB
INR BLD: 2
PT POC: 24.1 SECONDS
VALID INTERNAL CONTROL?: YES

## 2019-03-12 RX ORDER — SERTRALINE HYDROCHLORIDE 50 MG/1
50 TABLET, FILM COATED ORAL DAILY
Qty: 90 TAB | Refills: 1 | Status: SHIPPED | OUTPATIENT
Start: 2019-03-12

## 2019-03-12 RX ORDER — LOSARTAN POTASSIUM 50 MG/1
TABLET ORAL
Qty: 180 TAB | Refills: 1 | Status: SHIPPED | OUTPATIENT
Start: 2019-03-12

## 2019-03-12 RX ORDER — OMEPRAZOLE 20 MG/1
CAPSULE, DELAYED RELEASE ORAL
Qty: 90 CAP | Refills: 3 | Status: SHIPPED | OUTPATIENT
Start: 2019-03-12 | End: 2020-07-20

## 2019-03-12 RX ORDER — TRAZODONE HYDROCHLORIDE 50 MG/1
50 TABLET ORAL
Qty: 90 TAB | Refills: 1 | Status: SHIPPED | OUTPATIENT
Start: 2019-03-12 | End: 2019-09-26 | Stop reason: SDUPTHER

## 2019-03-12 RX ORDER — METOPROLOL TARTRATE 50 MG/1
50 TABLET ORAL 2 TIMES DAILY
Qty: 180 TAB | Refills: 1 | Status: SHIPPED | OUTPATIENT
Start: 2019-03-12

## 2019-03-12 RX ORDER — FLUTICASONE PROPIONATE 50 MCG
2 SPRAY, SUSPENSION (ML) NASAL DAILY
Qty: 1 BOTTLE | Refills: 3 | Status: SHIPPED | OUTPATIENT
Start: 2019-03-12

## 2019-03-12 RX ORDER — DILTIAZEM HYDROCHLORIDE 360 MG/1
360 CAPSULE, EXTENDED RELEASE ORAL DAILY
Qty: 90 CAP | Refills: 1 | Status: SHIPPED | OUTPATIENT
Start: 2019-03-12

## 2019-03-12 NOTE — PROGRESS NOTES
Mei Gonzales is a [de-identified] y.o. female (: 1940) presenting to address:    Chief Complaint   Patient presents with   [facility] ED Follow-up     Fall    Anticoagulation       Vitals:    19 1314   BP: 130/68   Pulse: 77   Resp: 20   Temp: 98.6 °F (37 °C)   TempSrc: Oral   SpO2: 96%   Weight: 223 lb (101.2 kg)   Height: 5' 2\" (1.575 m)   PainSc:   8   PainLoc: Generalized       Learning Assessment:     Learning Assessment 2018   PRIMARY LEARNER Patient   BARRIERS PRIMARY LEARNER NONE   CO-LEARNER CAREGIVER No   PRIMARY LANGUAGE ENGLISH    NEED No   LEARNER PREFERENCE PRIMARY PICTURES   LEARNING SPECIAL TOPICS none   ANSWERED BY patient   RELATIONSHIP SELF   ASSESSMENT COMMENT na     Depression Screening:     3 most recent PHQ Screens 2018   PHQ Not Done Active Diagnosis of Depression or Bipolar Disorder   Little interest or pleasure in doing things -   Feeling down, depressed, irritable, or hopeless -   Total Score PHQ 2 -   Trouble falling or staying asleep, or sleeping too much -   Feeling tired or having little energy -   Poor appetite, weight loss, or overeating -   Feeling bad about yourself - or that you are a failure or have let yourself or your family down -   Trouble concentrating on things such as school, work, reading, or watching TV -   Moving or speaking so slowly that other people could have noticed; or the opposite being so fidgety that others notice -   Thoughts of being better off dead, or hurting yourself in some way -   PHQ 9 Score -   How difficult have these problems made it for you to do your work, take care of your home and get along with others -     Fall Risk Assessment:     Fall Risk Assessment, last 12 mths 3/12/2019   Able to walk? Yes   Fall in past 12 months? Yes   Fall with injury? Yes   Number of falls in past 12 months 1   Fall Risk Score 2     Abuse Screening:     Abuse Screening Questionnaire 2017   Do you ever feel afraid of your partner?  N   Are you in a relationship with someone who physically or mentally threatens you? N   Is it safe for you to go home? Y     Coordination of Care Questionaire:   1. Have you been to the ER, urgent care clinic since your last visit? Hospitalized since your last visit? YES [facility]    2. Have you seen or consulted any other health care providers outside of the [address] [place] [address] since your last visit? Include any pap smears or colon screening. NO    Advanced Directive:   1. Do you have an Advanced Directive? YES    2. Would you like information on Advanced Directives?  NO

## 2019-03-12 NOTE — PROGRESS NOTES
Assessment/Plan:    1. Acute pain of right shoulder, fall and acute bursitis of R shoulder - referral ortho b/c she is moving out of town in a few weeks. May benefit from steroid injection. Home exercises. - REFERRAL TO ORTHOPEDICS    2. Facial hematoma, initial encounter  -will heal over next few weeks. 3. Chronic atrial fibrillation (HCC)  -INR 2. Cont current regimen  - AMB POC PT/INR    4 Acute non-recurrent maxillary sinusitis- refilled. - fluticasone propionate (FLONASE) 50 mcg/actuation nasal spray; 2 Sprays by Both Nostrils route daily. Dispense: 1 Bottle; Refill: 3    5. Depression with anxiety  -cont current  - sertraline (ZOLOFT) 50 mg tablet; Take 1 Tab by mouth daily. Dispense: 90 Tab; Refill: 1    6. Generalized abdominal pain      The plan was discussed with the patient. The patient verbalized understanding and is in agreement with the plan. All medication potential side effects were discussed with the patient. Health Maintenance:   Health Maintenance   Topic Date Due    Shingrix Vaccine Age 49> (1 of 2) 04/21/1988    Pneumococcal 65+ Low/Medium Risk (2 of 2 - PPSV23) 12/04/2018    HEMOGLOBIN A1C Q6M  06/05/2019    FOOT EXAM Q1  07/12/2019    MICROALBUMIN Q1  07/12/2019    LIPID PANEL Q1  07/12/2019    MEDICARE YEARLY EXAM  12/06/2019    GLAUCOMA SCREENING Q2Y  09/18/2020    EYE EXAM RETINAL OR DILATED  09/18/2020    DTaP/Tdap/Td series (2 - Td) 09/06/2027    Bone Densitometry (Dexa) Screening  Completed    Influenza Age 5 to Adult  Completed       Mei Christian is a [de-identified] y.o. female and presents with ED Follow-up (Fall) and Anticoagulation     Subjective:  Pt was recently seen in ER 3/3/19 after falling at [address] [place] and hitting her head. She states she was closing the bathroom door and the door slipped and she fell. This comes one month after starting pain meds. But states she hadn't taken any pain meds. Denies dizziness. No other recent falls.   occ endorses feeling dizzy at the time. I reviewed imaging, CT head and spine were ok. Did have scalp hematoma. INR was subtherapeutic. Previously on 8mg and was therapeutic last month. Still has ongoing R shoulder pain since the fall. The xray was unremarkable. ROS:  Constitutional: No recent weight change. No weakness/fatigue. No f/c. HENT: No HA, dizziness. No hearing loss/tinnitus. No nasal congestion/discharge. Eyes: No change in vision, double/blurred vision or eye pain/redness. Cardiovascular: No CP/palpitations. No CHO/orthopnea/PND. Respiratory: No cough/sputum, dyspnea, wheezing. Musculoskeletal: + joint pain/stiffness. No muscle pain/tenderness. The problem list was updated as a part of today's visit. Patient Active Problem List   Diagnosis Code    Osteopenia of multiple sites M85.89    ANA CRISTINA (obstructive sleep apnea) G47.33    A-fib ([facility]) I48.91    Right bundle branch block (RBBB) I45.10    Type 2 diabetes mellitus with right eye affected by mild nonproliferative retinopathy without macular edema, without long-term current use of insulin ([facility] [address] Z09.1099    Anticoagulation monitoring, INR range 2-3 Z79.01    Wheezing R06.2    Depression with anxiety F41.8    Severe obesity (BMI >= 40) ([facility]) E66.01    Essential hypertension I10    DDD (degenerative disc disease), thoracolumbar M51.35   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     SH:  Social History     Tobacco Use    Smoking status: Never Smoker    Smokeless tobacco: Never Used   Substance Use Topics    Alcohol use: No    Drug use: No       Medications/Allergies:  Current Outpatient Medications on File Prior to Visit   Medication Sig Dispense Refill    [START ON 4/17/2019] HYDROcodone-acetaminophen (NORCO) 5-325 mg per tablet Take 1 Tab by mouth daily as needed for Pain. 30 Tab 0    hydroCHLOROthiazide (HYDRODIURIL) 12.5 mg tablet TAKE 1 TABLET BY MOUTH ONCE DAILY IN THE MORNING 90 Tab 1    latanoprost (XALATAN) 0.005 % ophthalmic solution Administer 1 Drop to both eyes nightly.  warfarin (COUMADIN) 6 mg tablet Take with 2mg for total of 8mg. 90 Tab 0    warfarin (COUMADIN) 2 mg tablet Take with 6mg for total of 8mg daily 90 Tab 0    triamcinolone acetonide (KENALOG) 0.1 % ointment Apply  to affected area two (2) times a day. use thin layer 30 g 0    polyethylene glycol (MIRALAX) 17 gram/dose powder Take 17 g by mouth daily. 765 g 1    fluticasone (FLONASE) 50 mcg/actuation nasal spray 2 Sprays by Both Nostrils route daily. 1 Bottle 0    omeprazole (PRILOSEC) 20 mg capsule TAKE 1 CAPSULE BY MOUTH ONCE DAILY 90 Cap 1    fluticasone (FLOVENT HFA) 110 mcg/actuation inhaler Take 2 Puffs by inhalation every twelve (12) hours. 1 Inhaler 0    metoprolol tartrate (LOPRESSOR) 50 mg tablet Take 1 Tab by mouth two (2) times a day. 180 Tab 1    dilTIAZem (TIAZAC) 360 mg ER capsule Take 1 Cap by mouth daily. 90 Cap 1    losartan (COZAAR) 50 mg tablet TAKE 1 TABLET BY MOUTH TWICE DAILY 180 Tab 1    sertraline (ZOLOFT) 50 mg tablet Take 1 Tab by mouth daily. 90 Tab 1    traZODone (DESYREL) 50 mg tablet Take 1 Tab by mouth nightly as needed for Sleep. 90 Tab 1    dicyclomine (BENTYL) 10 mg capsule Take 1 Cap by mouth three (3) times daily as needed. 90 Cap 0     No current facility-administered medications on file prior to visit.          Allergies   Allergen Reactions    Sulfa (Sulfonamide Antibiotics) Photosensitivity    Wellbutrin [Bupropion Hcl] Itching       Objective:  Visit Vitals  /68 (BP 1 Location: Left arm, BP Patient Position: Sitting)   Pulse 77   Temp 98.6 °F (37 °C) (Oral)   Resp 20   Ht 5' 2\" (1.575 m)   Wt 223 lb (101.2 kg)   LMP  (LMP Unknown)   SpO2 96%   BMI 40.79 kg/m²      Constitutional: Well developed, nourished, no distress, alert, obese habitus   HENT: Exterior ears and tympanic membranes normal bilaterally. Supple neck. +R facial hematoma   Eyes: Conjunctiva normal. PERRL. Eyelids normal.   CV: S1, S2.  RRR. No murmurs/rubs. No thrills palpated. No carotid bruits. Intact distal pulses. No edema. No aortic bruits. Pulm: No abnormalities on inspection. Clear to auscultation bilaterally. No wheezing/rhonchi. Normal effort. MS: pain over R deltoid bursa. No pain over R bicep or tricep tendons.   Pain with pROM of R shoulder past 90 degrees forward flexiorn

## 2019-03-12 NOTE — PATIENT INSTRUCTIONS
Frozen Shoulder: Exercises  Your Care Instructions  Here are some examples of typical rehabilitation exercises for your condition. Start each exercise slowly. Ease off the exercise if you start to have pain. Your doctor or physical therapist will tell you when you can start these exercises and which ones will work best for you. How to do the exercises  Neck stretches    1. Look straight ahead, and tip your right ear to your right shoulder. Do not let your left shoulder rise up as you tip your head to the right. 2. Hold 15 to 30 seconds. 3. Tilt your head to the left. Do not let your right shoulder rise up as you tip your head to the left. 4. Hold for 15 to 30 seconds. 5. Repeat 2 to 4 times to each side. Shoulder rolls    1. Sit comfortably with your feet shoulder-width apart. You can also do this exercise while standing. 2. Roll your shoulders up, then back, and then down in a smooth, circular motion. 3. Repeat 2 to 4 times. Shoulder flexion (lying down)    1. Lie on your back, holding a wand with your hands. Your palms should face down as you hold the wand. Place your hands slightly wider than your shoulders. 2. Keeping your elbows straight, slowly raise your arms over your head until you feel a stretch in your shoulders, upper back, and chest.  3. Hold 15 to 30 seconds. 4. Repeat 2 to 4 times. Shoulder rotation (lying down)    1. Lie on your back and hold a wand in both hands with your elbows bent and your palms up. 2. Keeping your elbows close to your body, move the wand across your body toward the arm that has pain. 3. Hold for 15 to 30 seconds. 4. Repeat 2 to 4 times. Shoulder internal rotation with towel    1. Roll up a towel lengthwise. Hold the towel above and behind your head with the arm that is not sore. 2. With your sore arm, reach behind your back and grasp the towel.   3. Using the arm above your head, pull the towel upward until you feel a stretch on the front and outside of your sore shoulder. 4. Hold 15 to 30 seconds. 5. Relax and move the towel back down to the starting position. 6. Repeat 2 to 4 times. Shoulder blade squeeze    1. While standing with your arms at your sides, squeeze your shoulder blades together. Do not raise your shoulders up as you are squeezing. 2. Hold for 6 seconds. 3. Repeat 8 to 12 times. Follow-up care is a key part of your treatment and safety. Be sure to make and go to all appointments, and call your doctor if you are having problems. It's also a good idea to know your test results and keep a list of the medicines you take. Where can you learn more? Go to http://bonita-loreta.info/. Enter R144 in the search box to learn more about \"Frozen Shoulder: Exercises. \"  Current as of: September 20, 2018  Content Version: 11.9  © 6764-1958 [employer]. Care instructions adapted under license by [employer] (which disclaims liability or warranty for this information). If you have questions about a medical condition or this instruction, always ask your healthcare professional. [place] [address] any warranty or liability for your use of this information. Rotator Cuff: Exercises  Your Care Instructions  Here are some examples of typical rehabilitation exercises for your condition. Start each exercise slowly. Ease off the exercise if you start to have pain. Your doctor or physical therapist will tell you when you can start these exercises and which ones will work best for you. How to do the exercises  Pendulum swing    6. Hold on to a table or the back of a chair with your good arm. Then bend forward a little and let your sore arm hang straight down. This exercise does not use the arm muscles. Rather, use your legs and your hips to create movement that makes your arm swing freely.   7. Use the movement from your hips and legs to guide the slightly swinging arm back and forth like a pendulum (or elephant trunk). Then guide it in circles that start small (about the size of a dinner plate). Make the circles a bit larger each day, as your pain allows. 8. Do this exercise for 5 minutes, 5 to 7 times each day. 9. As you have less pain, try bending over a little farther to do this exercise. This will increase the amount of movement at your shoulder. Posterior stretching exercise    4. Hold the elbow of your injured arm with your other hand. 5. Use your hand to pull your injured arm gently up and across your body. You will feel a gentle stretch across the back of your injured shoulder. 6. Hold for at least 15 to 30 seconds. Then slowly lower your arm. 7. Repeat 2 to 4 times. Up-the-back stretch    5. Put your hand in your back pocket. Let it rest there to stretch your shoulder. 6. With your other hand, hold your injured arm (palm outward) behind your back by the wrist. Pull your arm up gently to stretch your shoulder. 7. Next, put a towel over your other shoulder. Put the hand of your injured arm behind your back. Now hold the back end of the towel. With the other hand, hold the front end of the towel in front of your body. Pull gently on the front end of the towel. This will bring your hand farther up your back to stretch your shoulder. Overhead stretch    5. Standing about an arm's length away, grasp onto a solid surface. You could use a countertop, a doorknob, or the back of a sturdy chair. 6. With your knees slightly bent, bend forward with your arms straight. Lower your upper body, and let your shoulders stretch. 7. As your shoulders are able to stretch farther, you may need to take a step or two backward. 8. Hold for at least 15 to 30 seconds. Then stand up and relax. If you had stepped back during your stretch, step forward so you can keep your hands on the solid surface. 9. Repeat 2 to 4 times. Shoulder flexion (lying down)    7.  Lie on your back, holding a wand with both hands. Your palms should face down as you hold the wand. 8. Keeping your elbows straight, slowly raise your arms over your head. Raise them until you feel a stretch in your shoulders, upper back, and chest.  9. Hold for 15 to 30 seconds. 10. Repeat 2 to 4 times. Shoulder rotation (lying down)    4. Lie on your back. Hold a wand with both hands with your elbows bent and palms up. 5. Keep your elbows close to your body, and move the wand across your body toward the sore arm. 6. Hold for 8 to 12 seconds. 7. Repeat 2 to 4 times. Wall climbing (to the side)    1. Stand with your side to a wall so that your fingers can just touch it at an angle about 30 degrees toward the front of your body. 2. Walk the fingers of your injured arm up the wall as high as pain permits. Try not to shrug your shoulder up toward your ear as you move your arm up. 3. Hold that position for a count of at least 15 to 20.  4. Walk your fingers back down to the starting position. 5. Repeat at least 2 to 4 times. Try to reach higher each time. Wall climbing (to the front)    1. Face a wall, and stand so your fingers can just touch it. 2. Keeping your shoulder down, walk the fingers of your injured arm up the wall as high as pain permits. (Don't shrug your shoulder up toward your ear.)  3. Hold your arm in that position for at least 15 to 30 seconds. 4. Slowly walk your fingers back down to where you started. 5. Repeat at least 2 to 4 times. Try to reach higher each time. Shoulder blade squeeze    1. Stand with your arms at your sides, and squeeze your shoulder blades together. Do not raise your shoulders up as you squeeze. 2. Hold 6 seconds. 3. Repeat 8 to 12 times. Scapular exercise: Arm reach    1. Lie flat on your back. This exercise is a very slight motion that starts with your arms raised (elbows straight, arms straight). 2. From this position, reach higher toward the liana or ceiling. Keep your elbows straight. All motion should be from your shoulder blade only. 3. Relax your arms back to where you started. 4. Repeat 8 to 12 times. Arm raise to the side    1. Slowly raise your injured arm to the side, with your thumb facing up. Raise your arm 60 degrees at the most (shoulder level is 90 degrees). 2. Hold the position for 3 to 5 seconds. Then lower your arm back to your side. If you need to, bring your \"good\" arm across your body and place it under the elbow as you lower your injured arm. Use your good arm to keep your injured arm from dropping down too fast.  3. Repeat 8 to 12 times. 4. When you first start out, don't hold any extra weight in your hand. As you get stronger, you may use a 1-pound to 2-pound dumbbell or a small can of food. Shoulder flexor and extensor exercise    1. Push forward (flex): Stand facing a wall or doorjamb, about 6 inches or less back. Hold your injured arm against your body. Make a closed fist with your thumb on top. Then gently push your hand forward into the wall with about 25% to 50% of your strength. Don't let your body move backward as you push. Hold for about 6 seconds. Relax for a few seconds. Repeat 8 to 12 times. 2. Push backward (extend): Stand with your back flat against a wall. Your upper arm should be against the wall, with your elbow bent 90 degrees (your hand straight ahead). Push your elbow gently back against the wall with about 25% to 50% of your strength. Don't let your body move forward as you push. Hold for about 6 seconds. Relax for a few seconds. Repeat 8 to 12 times. Scapular exercise: Wall push-ups    1. Stand facing a wall, about 12 inches to 18 inches away. 2. Place your hands on the wall at shoulder height. 3. Slowly bend your elbows and bring your face to the wall. Keep your back and hips straight. 4. Push back to where you started. 5. Repeat 8 to 12 times.   6. When you can do this exercise against a wall comfortably, you can try it against a counter. You can then slowly progress to the end of a couch, then to a sturdy chair, and finally to the floor. Scapular exercise: Retraction    1. Put the band around a solid object at about waist level. (A bedpost will work well.) Each hand should hold an end of the band. 2. With your elbows at your sides and bent to 90 degrees, pull the band back. Your shoulder blades should move toward each other. Then move your arms back where you started. 3. Repeat 8 to 12 times. 4. If you have good range of motion in your shoulders, try this exercise with your arms lifted out to the sides. Keep your elbows at a 90-degree angle. Raise the elastic band up to about shoulder level. Pull the band back to move your shoulder blades toward each other. Then move your arms back where you started. Internal rotator strengthening exercise    1. Start by tying a piece of elastic exercise material to a doorknob. You can use surgical tubing or Thera-Band. 2. Stand or sit with your shoulder relaxed and your elbow bent 90 degrees. Your upper arm should rest comfortably against your side. Squeeze a rolled towel between your elbow and your body for comfort. This will help keep your arm at your side. 3. Hold one end of the elastic band in the hand of the painful arm. 4. Slowly rotate your forearm toward your body until it touches your belly. Slowly move it back to where you started. 5. Keep your elbow and upper arm firmly tucked against the towel roll or at your side. 6. Repeat 8 to 12 times. External rotator strengthening exercise    1. Start by tying a piece of elastic exercise material to a doorknob. You can use surgical tubing or Thera-Band. (You may also hold one end of the band in each hand.)  2. Stand or sit with your shoulder relaxed and your elbow bent 90 degrees. Your upper arm should rest comfortably against your side. Squeeze a rolled towel between your elbow and your body for comfort.  This will help keep your arm at your side. 3. Hold one end of the elastic band with the hand of the painful arm. 4. Start with your forearm across your belly. Slowly rotate the forearm out away from your body. Keep your elbow and upper arm tucked against the towel roll or the side of your body until you begin to feel tightness in your shoulder. Slowly move your arm back to where you started. 5. Repeat 8 to 12 times. Follow-up care is a key part of your treatment and safety. Be sure to make and go to all appointments, and call your doctor if you are having problems. It's also a good idea to know your test results and keep a list of the medicines you take. Where can you learn more? Go to http://bonita-loreta.info/. Enter Meagan Bass in the search box to learn more about \"Rotator Cuff: Exercises. \"  Current as of: September 20, 2018  Content Version: 11.9  © 5585-4025 [employer]. Care instructions adapted under license by [employer] (which disclaims liability or warranty for this information). If you have questions about a medical condition or this instruction, always ask your healthcare professional. [place] [address] any warranty or liability for your use of this information.

## 2019-03-20 ENCOUNTER — TELEPHONE (OUTPATIENT)
Dept: FAMILY MEDICINE CLINIC | Age: 79
End: 2019-03-20

## 2019-03-20 NOTE — TELEPHONE ENCOUNTER
Pt called for refill on losartan and explains that hers was recalled and she would like a replacement medication for this. Pt is out of losartan. Took her last one on 3.20.2019.  Please call if any issues [phone]

## 2019-03-22 NOTE — TELEPHONE ENCOUNTER
Pt did get a replacement med from [facility] per daughter Jaymie Ly. They are packing up right now for moving out of state.

## 2019-03-25 ENCOUNTER — CLINICAL SUPPORT (OUTPATIENT)
Dept: FAMILY MEDICINE CLINIC | Age: 79
End: 2019-03-25

## 2019-03-25 DIAGNOSIS — I48.91 ATRIAL FIBRILLATION, UNSPECIFIED TYPE (HCC): Primary | ICD-10-CM

## 2019-03-25 LAB
INR BLD: 2.1
PT POC: 25 SECONDS
VALID INTERNAL CONTROL?: YES

## 2019-03-25 NOTE — PROGRESS NOTES
Pura Grullon is a [de-identified] y.o. female who presents today for Anticoagulation monitoring. Indication: Atrial Fibrillation  INR Goal: 2.0-3.0. Current dose:  Coumadin 8 mg daily. Missed Coumadin Doses:  None  Medication Changes:  no  Dietary Changes:  no    Symptoms: taking coumadin appropriately without any bleeding. Latest INRs:  Lab Results   Component Value Date/Time    INR POC 2.0 03/12/2019 01:18 PM    INR POC 2.0 02/11/2019 02:14 PM    INR POC 2.0 01/28/2019 02:47 PM        New Coumadin dose:.current treatment plan is effective, no change in therapy. Results reviewed with Dr Radha Russ and patient is aware of plan she will be have checked at new pcp out of state or call if she isnt established and can send lab order to be check . Patient aware and voiced understanding . Next check to be scheduled for  4 weeks.

## 2019-09-27 RX ORDER — TRAZODONE HYDROCHLORIDE 50 MG/1
TABLET ORAL
Qty: 90 TAB | Refills: 1 | Status: SHIPPED | OUTPATIENT
Start: 2019-09-27

## 2020-07-20 RX ORDER — OMEPRAZOLE 20 MG/1
CAPSULE, DELAYED RELEASE ORAL
Qty: 90 CAP | Refills: 0 | Status: SHIPPED | OUTPATIENT
Start: 2020-07-20

## 2021-04-14 NOTE — PROGRESS NOTES
Chief Complaint Patient presents with [address] [place] [address] Annual Wellness Visit Assessment/Plan 1. Type 2 diabetes mellitus with right eye affected by mild nonproliferative retinopathy without macular edema, without long-term current use of insulin ([address]- A1c 6.8. Cont current and f/u in 6mos. - AMB POC HEMOGLOBIN A1C 2. Cough 
-persistent despite steroids, inhalers, doxy. Will treat with augmentin, robitussin per pt request. 
- amoxicillin-clavulanate (AUGMENTIN) 500-125 mg per tablet; Take 1 Tab by mouth two (2) times a day for 7 days. Dispense: 14 Tab; Refill: 0 
- guaiFENesin-codeine (ROBITUSSIN AC) 100-10 mg/5 mL solution; Take 5 mL by mouth nightly as needed for Cough. Max Daily Amount: 5 mL. Dispense: 180 mL; Refill: 0 
 
3. Medicare annual wellness visit, subsequent The plan was discussed with the patient. The patient verbalized understanding and is in agreement with the plan. All medication potential side effects were discussed with the patient. SUBJECTIVE:  
Fabricio Knutson is a [de-identified] y.o. female who complains of ongoing productive cough and nasal congestion and vocal hoarseness and ear fullness. No f/c. Endorses wheezing. Was seen 11/20 in urgent care. I reviewed those records- xray was negative. Was given albuterol, doxy, prednisone and robitussin w/ codeine. She is using albuterol bid w/ relief. She has h/o wheezing previously and has refused PFTs in the past, so likely has some underlying lung disease. Was seen 11/27 by me and given IM steroids. No f/c.  is requesting more DM2 - A1c is 6.8. Compliant with meds Review of Systems - ENT ROS: negative Respiratory ROS: positive for - cough Cardiovascular ROS: no chest pain or dyspnea on exertion Gastrointestinal ROS: no abdominal pain, change in bowel habits, or black or bloody stools Musculoskeletal ROS: negative Neurological ROS: negative Physical Examination:  
Visit Vitals Returned Maddy's call advised Timothy had previously received chemo with Folfox and Herceptin back in October 2020 and as of his visit with  on 4/5/21 his recommendations were hospice as there is no more benefit to chemo. Asked Maddy to call back if she has any further questions or needs medical records.   /80 (BP 1 Location: Left arm, BP Patient Position: Sitting) Pulse 81 Temp 98.3 °F (36.8 °C) (Oral) Resp 20 Ht 5' 2\" (1.575 m) Wt 225 lb (102.1 kg) SpO2 97% BMI 41.15 kg/m² Constitutional: Well developed, nourished, no distress, alert HENT: Exterior ears and tympanic membranes normal bilaterally. Supple neck. No thyromegaly or lymphadenopathy. Oropharynx clear and moist mucous membranes. Eyes: Conjunctiva normal. PERRL. CV: S1, S2.  RRR. No murmurs/rubs. No thrills palpated. No carotid bruits. Intact distal pulses. No edema. Pulm: No abnormalities on inspection. Clear to auscultation bilaterally. No wheezing/rhonchi. Normal effort. Rolanda Kim MD 
 
 
 
This is the Subsequent Medicare Annual Wellness Exam, performed 12 months or more after the Initial AWV or the last Subsequent AWV I have reviewed the patient's medical history in detail and updated the computerized patient record. History Past Medical History:  
Diagnosis Date  A-fib ([facility]  Asthma  Chronic back pain  Depression  Diabetes ([facility]  Gastritis  History of right breast cancer  Hypertension Past Surgical History:  
Procedure Laterality Date  HX APPENDECTOMY  HX MASTECTOMY    
 right Current Outpatient Medications Medication Sig Dispense Refill  guaiFENesin ER (MUCINEX) 600 mg ER tablet Take 600 mg by mouth two (2) times a day.  omeprazole (PRILOSEC) 20 mg capsule TAKE 1 CAPSULE BY MOUTH ONCE DAILY 90 Cap 1  
 fluticasone (FLOVENT HFA) 110 mcg/actuation inhaler Take 2 Puffs by inhalation every twelve (12) hours. 1 Inhaler 0  
 metoprolol tartrate (LOPRESSOR) 50 mg tablet Take 1 Tab by mouth two (2) times a day. 180 Tab 1  
 warfarin (COUMADIN) 7.5 mg tablet TAKE 1 TABLET BY MOUTH ONCE DAILY 90 Tab 0  
 dilTIAZem (TIAZAC) 360 mg ER capsule Take 1 Cap by mouth daily.  90 Cap 1  
  losartan (COZAAR) 50 mg tablet TAKE 1 TABLET BY MOUTH TWICE DAILY 180 Tab 1  
 sertraline (ZOLOFT) 50 mg tablet Take 1 Tab by mouth daily. 90 Tab 1  
 traZODone (DESYREL) 50 mg tablet Take 1 Tab by mouth nightly as needed for Sleep. 90 Tab 1  polyethylene glycol (MIRALAX) 17 gram/dose powder Take 17 g by mouth daily. 765 g 1  
 dicyclomine (BENTYL) 10 mg capsule Take 1 Cap by mouth three (3) times daily as needed. 90 Cap 0  cholecalciferol (VITAMIN D3) 1,000 unit tablet Take 1 Tab by mouth daily.  acetaminophen (TYLENOL) 500 mg tablet Take 500 mg by mouth every four (4) hours as needed.  brinzolamide-brimonidine 1-0.2 % drps Administer 1 Drop to both eyes two (2) times a day.  latanoprost (XALATAN) 0.005 % ophthalmic solution Administer 1 Drop to both eyes nightly.  cyanocobalamin (VITAMIN B12) 500 mcg tablet Take 500 mcg by mouth daily.  triamcinolone acetonide (KENALOG) 0.1 % ointment Apply  to affected area two (2) times a day. use thin layer 30 g 0 Allergies Allergen Reactions  Sulfa (Sulfonamide Antibiotics) Photosensitivity  Wellbutrin [Bupropion Hcl] Itching Family History Problem Relation Age of Onset  Asthma Mother  Hypertension Father  Heart Attack Father Social History Tobacco Use  Smoking status: Never Smoker  Smokeless tobacco: Never Used Substance Use Topics  Alcohol use: No  
 
Patient Active Problem List  
Diagnosis Code  Osteopenia of multiple sites M85.89  
 ANA CRISTINA (obstructive sleep apnea) G47.33  
 A-fib ([facility]) I48.91  
 Right bundle branch block (RBBB) I45.10  Type 2 diabetes mellitus with right eye affected by mild nonproliferative retinopathy without macular edema, without long-term current use of insulin ([facility] J12.5752  Anticoagulation monitoring, INR range 2-3 Z79.01  Wheezing R06.2  Depression with anxiety F41.8  Severe obesity (BMI >= 40) ([facility]) E66.01  
 Essential hypertension I10  
  DDD (degenerative disc disease), thoracolumbar M51.35  
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 associated with type 2 diabetes mellitus ([facility] B55.1894 Depression Risk Factor Screening: PHQ over the last two weeks 12/5/2018 PHQ Not Done Active Diagnosis of Depression or Bipolar Disorder Little interest or pleasure in doing things - Feeling down, depressed, irritable, or hopeless - Total Score PHQ 2 - Trouble falling or staying asleep, or sleeping too much - Feeling tired or having little energy - Poor appetite, weight loss, or overeating - Feeling bad about yourself - or that you are a failure or have let yourself or your family down - Trouble concentrating on things such as school, work, reading, or watching TV - Moving or speaking so slowly that other people could have noticed; or the opposite being so fidgety that others notice - Thoughts of being better off dead, or hurting yourself in some way -  
PHQ 9 Score - How difficult have these problems made it for you to do your work, take care of your home and get along with others - Alcohol Risk Factor Screening: You do not drink alcohol or very rarely. Functional Ability and Level of Safety:  
Hearing Loss Hearing is good. Activities of Daily Living The home contains: handrails Patient does total self care Fall Risk Fall Risk Assessment, last 12 mths 12/5/2018 Able to walk? Yes Fall in past 12 months? No  
Fall with injury? -  
Number of falls in past 12 months - Fall Risk Score -  
 
 
Abuse Screen Patient is not abused Cognitive Screening Evaluation of Cognitive Function: 
Has your family/caregiver stated any concerns about your memory: no 
Normal 
 
Patient Care Team  
Patient Care Team: 
Dalton García MD as PCP - General (Internal Medicine) Lexx Long RN as Ambulatory Care Navigator Rachel Vivas LPN as Ambulatory Care Navigator Assessment/Plan Education and counseling provided: 
Are appropriate based on today's review and evaluation End-of-Life planning (with patient's consent) Influenza Vaccine Diagnoses and all orders for this visit: 
 
1. Type 2 diabetes mellitus with right eye affected by mild nonproliferative retinopathy without macular edema, without long-term current use of insulin ([facility] -     AMB POC HEMOGLOBIN A1C 2. Cough 
-     amoxicillin-clavulanate (AUGMENTIN) 500-125 mg per tablet; Take 1 Tab by mouth two (2) times a day for 7 days. 
-     guaiFENesin-codeine (ROBITUSSIN AC) 100-10 mg/5 mL solution; Take 5 mL by mouth nightly as needed for Cough. Max Daily Amount: 5 mL. 3. Medicare annual wellness visit, subsequent Health Maintenance Due Topic Date Due  Shingrix Vaccine Age 50> (1 of 2) 04/21/1988  Influenza Age 5 to Adult  08/01/2018  Pneumococcal 65+ Low/Medium Risk (2 of 2 - PPSV23) 12/04/2018

## 2023-02-22 NOTE — PROGRESS NOTES
Marita Sinclair is a [de-identified] y.o. female (: 1940) presenting to address:    Chief Complaint   Patient presents with    Anticoagulation    Diabetes    Hypertension       Vitals:    18 1047   BP: 132/80   Pulse: 95   Resp: 20   Temp: 98.5 °F (36.9 °C)   TempSrc: Oral   SpO2: 96%   Weight: 220 lb (99.8 kg)   Height: 5' 2\" (1.575 m)   PainSc:   7   PainLoc: Back       Depression Screening:     PHQ over the last two weeks 2017   PHQ Not Done Active Diagnosis of Depression or Bipolar Disorder   Little interest or pleasure in doing things -   Feeling down, depressed or hopeless -   Total Score PHQ 2 -   Trouble falling or staying asleep, or sleeping too much -   Feeling tired or having little energy -   Poor appetite or overeating -   Feeling bad about yourself - or that you are a failure or have let yourself or your family down -   Trouble concentrating on things such as school, work, reading or watching TV -   Moving or speaking so slowly that other people could have noticed; or the opposite being so fidgety that others notice -   Thoughts of being better off dead, or hurting yourself in some way -   PHQ 9 Score -   How difficult have these problems made it for you to do your work, take care of your home and get along with others -     Fall Risk Assessment:     Fall Risk Assessment, last 12 mths 2017   Able to walk? Yes   Fall in past 12 months? Yes   Fall with injury? Yes   Number of falls in past 12 months 1   Fall Risk Score 2     Abuse Screening:     Abuse Screening Questionnaire 2017   Do you ever feel afraid of your partner? N   Are you in a relationship with someone who physically or mentally threatens you? N   Is it safe for you to go home? Y     Coordination of Care Questionaire:   1. Have you been to the ER, urgent care clinic since your last visit? Hospitalized since your last visit? NO    2.  Have you seen or consulted any other health care providers outside of the [facility] since your last visit? Include any pap smears or colon screening. NO    Advanced Directive:   1. Do you have an Advanced Directive? YES    2. Would you like information on Advanced Directives?  NO none

## 2025-07-08 NOTE — Clinical Note
FYI Patient is a BLADIMIR coming in 8/2/@ 2:00 pm. She will need INR home on 7.5 coumadin INR at d/c 1.96 card wants it between 2-3.  Her insurance requires referral for Dr Hillary Arias office 27-Jan-2025